# Patient Record
Sex: MALE | Race: WHITE | NOT HISPANIC OR LATINO | Employment: FULL TIME | URBAN - METROPOLITAN AREA
[De-identification: names, ages, dates, MRNs, and addresses within clinical notes are randomized per-mention and may not be internally consistent; named-entity substitution may affect disease eponyms.]

---

## 2018-07-02 ENCOUNTER — OFFICE VISIT (OUTPATIENT)
Dept: FAMILY MEDICINE CLINIC | Facility: CLINIC | Age: 43
End: 2018-07-02

## 2018-07-02 VITALS
HEIGHT: 73 IN | BODY MASS INDEX: 29.31 KG/M2 | OXYGEN SATURATION: 98 % | WEIGHT: 221.2 LBS | RESPIRATION RATE: 18 BRPM | HEART RATE: 82 BPM | SYSTOLIC BLOOD PRESSURE: 118 MMHG | DIASTOLIC BLOOD PRESSURE: 76 MMHG

## 2018-07-02 DIAGNOSIS — G89.4 CHRONIC PAIN SYNDROME: ICD-10-CM

## 2018-07-02 DIAGNOSIS — M1A.0790 CHRONIC IDIOPATHIC GOUT INVOLVING TOE WITHOUT TOPHUS, UNSPECIFIED LATERALITY: ICD-10-CM

## 2018-07-02 DIAGNOSIS — I10 ESSENTIAL HYPERTENSION: ICD-10-CM

## 2018-07-02 DIAGNOSIS — Z00.00 HEALTHCARE MAINTENANCE: Primary | ICD-10-CM

## 2018-07-02 LAB
ALBUMIN SERPL-MCNC: 4.38 G/DL (ref 3.2–4.8)
ALBUMIN/GLOB SERPL: 2 G/DL (ref 1.5–2.5)
ALP SERPL-CCNC: 91 U/L (ref 25–100)
ALT SERPL W P-5'-P-CCNC: 53 U/L (ref 7–40)
ANION GAP SERPL CALCULATED.3IONS-SCNC: 10 MMOL/L (ref 3–11)
ARTICHOKE IGE QN: 118 MG/DL (ref 0–130)
AST SERPL-CCNC: 30 U/L (ref 0–33)
BASOPHILS # BLD AUTO: 0.01 10*3/MM3 (ref 0–0.2)
BASOPHILS NFR BLD AUTO: 0.3 % (ref 0–1)
BILIRUB SERPL-MCNC: 1 MG/DL (ref 0.3–1.2)
BUN BLD-MCNC: 13 MG/DL (ref 9–23)
BUN/CREAT SERPL: 14.3 (ref 7–25)
CALCIUM SPEC-SCNC: 9.3 MG/DL (ref 8.7–10.4)
CHLORIDE SERPL-SCNC: 103 MMOL/L (ref 99–109)
CHOLEST SERPL-MCNC: 177 MG/DL (ref 0–200)
CO2 SERPL-SCNC: 30 MMOL/L (ref 20–31)
CREAT BLD-MCNC: 0.91 MG/DL (ref 0.6–1.3)
CRP SERPL-MCNC: 0.12 MG/DL (ref 0–1)
DEPRECATED RDW RBC AUTO: 45.6 FL (ref 37–54)
EOSINOPHIL # BLD AUTO: 0.06 10*3/MM3 (ref 0–0.3)
EOSINOPHIL NFR BLD AUTO: 2 % (ref 0–3)
ERYTHROCYTE [DISTWIDTH] IN BLOOD BY AUTOMATED COUNT: 13.3 % (ref 11.3–14.5)
GFR SERPL CREATININE-BSD FRML MDRD: 90 ML/MIN/1.73
GLOBULIN UR ELPH-MCNC: 2.2 GM/DL
GLUCOSE BLD-MCNC: 122 MG/DL (ref 70–100)
HBA1C MFR BLD: 4.7 % (ref 4.8–5.6)
HCT VFR BLD AUTO: 43.4 % (ref 38.9–50.9)
HDLC SERPL-MCNC: 43 MG/DL (ref 40–60)
HGB BLD-MCNC: 15.2 G/DL (ref 13.1–17.5)
HIV1+2 AB SER QL: NORMAL
IMM GRANULOCYTES # BLD: 0.01 10*3/MM3 (ref 0–0.03)
IMM GRANULOCYTES NFR BLD: 0.3 % (ref 0–0.6)
LYMPHOCYTES # BLD AUTO: 0.95 10*3/MM3 (ref 0.6–4.8)
LYMPHOCYTES NFR BLD AUTO: 31.3 % (ref 24–44)
MCH RBC QN AUTO: 33.8 PG (ref 27–31)
MCHC RBC AUTO-ENTMCNC: 35 G/DL (ref 32–36)
MCV RBC AUTO: 96.4 FL (ref 80–99)
MONOCYTES # BLD AUTO: 0.28 10*3/MM3 (ref 0–1)
MONOCYTES NFR BLD AUTO: 9.2 % (ref 0–12)
NEUTROPHILS # BLD AUTO: 1.73 10*3/MM3 (ref 1.5–8.3)
NEUTROPHILS NFR BLD AUTO: 56.9 % (ref 41–71)
PLATELET # BLD AUTO: 137 10*3/MM3 (ref 150–450)
PMV BLD AUTO: 10.7 FL (ref 6–12)
POTASSIUM BLD-SCNC: 4.4 MMOL/L (ref 3.5–5.5)
PROT SERPL-MCNC: 6.6 G/DL (ref 5.7–8.2)
RBC # BLD AUTO: 4.5 10*6/MM3 (ref 4.2–5.76)
SODIUM BLD-SCNC: 143 MMOL/L (ref 132–146)
TRIGL SERPL-MCNC: 400 MG/DL (ref 0–150)
TSH SERPL DL<=0.05 MIU/L-ACNC: 2.65 MIU/ML (ref 0.35–5.35)
URATE SERPL-MCNC: 4.4 MG/DL (ref 3.7–9.2)
WBC NRBC COR # BLD: 3.04 10*3/MM3 (ref 3.5–10.8)

## 2018-07-02 PROCEDURE — 99386 PREV VISIT NEW AGE 40-64: CPT | Performed by: FAMILY MEDICINE

## 2018-07-02 PROCEDURE — 80061 LIPID PANEL: CPT | Performed by: FAMILY MEDICINE

## 2018-07-02 PROCEDURE — 83036 HEMOGLOBIN GLYCOSYLATED A1C: CPT | Performed by: FAMILY MEDICINE

## 2018-07-02 PROCEDURE — G0432 EIA HIV-1/HIV-2 SCREEN: HCPCS | Performed by: FAMILY MEDICINE

## 2018-07-02 PROCEDURE — 84550 ASSAY OF BLOOD/URIC ACID: CPT | Performed by: FAMILY MEDICINE

## 2018-07-02 PROCEDURE — 86140 C-REACTIVE PROTEIN: CPT | Performed by: FAMILY MEDICINE

## 2018-07-02 PROCEDURE — 36415 COLL VENOUS BLD VENIPUNCTURE: CPT | Performed by: FAMILY MEDICINE

## 2018-07-02 PROCEDURE — 85025 COMPLETE CBC W/AUTO DIFF WBC: CPT | Performed by: FAMILY MEDICINE

## 2018-07-02 PROCEDURE — 90715 TDAP VACCINE 7 YRS/> IM: CPT | Performed by: FAMILY MEDICINE

## 2018-07-02 PROCEDURE — 90471 IMMUNIZATION ADMIN: CPT | Performed by: FAMILY MEDICINE

## 2018-07-02 PROCEDURE — 84443 ASSAY THYROID STIM HORMONE: CPT | Performed by: FAMILY MEDICINE

## 2018-07-02 PROCEDURE — 80053 COMPREHEN METABOLIC PANEL: CPT | Performed by: FAMILY MEDICINE

## 2018-07-02 PROCEDURE — 93000 ELECTROCARDIOGRAM COMPLETE: CPT | Performed by: FAMILY MEDICINE

## 2018-07-02 RX ORDER — LOSARTAN POTASSIUM 100 MG/1
100 TABLET ORAL DAILY
Qty: 90 TABLET | Refills: 3 | Status: SHIPPED | OUTPATIENT
Start: 2018-07-02 | End: 2019-07-03 | Stop reason: SDUPTHER

## 2018-07-02 RX ORDER — MELATONIN
2000 DAILY
COMMUNITY
End: 2019-01-08

## 2018-07-02 RX ORDER — LOSARTAN POTASSIUM 100 MG/1
100 TABLET ORAL DAILY
COMMUNITY
Start: 2018-06-27 | End: 2018-07-02 | Stop reason: SDUPTHER

## 2018-07-02 RX ORDER — ALLOPURINOL 300 MG/1
300 TABLET ORAL DAILY
COMMUNITY
Start: 2018-06-27 | End: 2018-07-02 | Stop reason: SDUPTHER

## 2018-07-02 RX ORDER — COLCHICINE 0.6 MG/1
TABLET ORAL
Qty: 30 TABLET | Refills: 1 | Status: SHIPPED | OUTPATIENT
Start: 2018-07-02 | End: 2018-09-18 | Stop reason: SDUPTHER

## 2018-07-02 RX ORDER — HYDROCODONE BITARTRATE AND ACETAMINOPHEN 7.5; 325 MG/1; MG/1
1 TABLET ORAL AS NEEDED
COMMUNITY
End: 2019-01-08

## 2018-07-02 RX ORDER — ALLOPURINOL 300 MG/1
300 TABLET ORAL DAILY
Qty: 90 TABLET | Refills: 3 | Status: SHIPPED | OUTPATIENT
Start: 2018-07-02 | End: 2019-07-03 | Stop reason: SDUPTHER

## 2018-07-02 NOTE — PROGRESS NOTES
Subjective   Deepak Albarran is a 43 y.o. male.     History of Present Illness   New patient to the office.  He describes previous care with Dr. MOSHE Waldron.  He is here for his annual wellness evaluation.  He is not fasting.  He is also needing a refill of his blood pressure and gout medications.  He is tolerating them well with no adverse events.  He describes good blood pressure control and infrequent gout attacks.  He is also requesting a refill of his narcotic prescription that he takes intermittently for pain.    Review of Systems   Constitutional: Negative.    HENT: Negative.    Eyes: Negative.    Respiratory: Negative.    Cardiovascular: Negative.    Gastrointestinal: Negative.    Endocrine: Negative.    Genitourinary: Negative.    Musculoskeletal:        Arthritic pain   Skin: Negative.    Allergic/Immunologic: Negative.    Neurological: Negative.    Hematological: Negative.    Psychiatric/Behavioral: Negative.        Objective   Physical Exam   Constitutional: He is oriented to person, place, and time. He appears well-developed and well-nourished. He is cooperative.   HENT:   Head: Normocephalic and atraumatic.   Right Ear: Hearing and external ear normal.   Left Ear: Hearing and external ear normal.   Nose: Nose normal.   Mouth/Throat: Uvula is midline, oropharynx is clear and moist and mucous membranes are normal.   Eyes: Conjunctivae and EOM are normal. Pupils are equal, round, and reactive to light. No scleral icterus.   Neck: Trachea normal and normal range of motion. Neck supple. No JVD present. Carotid bruit is not present. No thyromegaly present.   Cardiovascular: Normal rate, regular rhythm, normal heart sounds and intact distal pulses.    Pulmonary/Chest: Effort normal and breath sounds normal.   Abdominal: Soft. Bowel sounds are normal. There is no hepatosplenomegaly. There is no tenderness.   Musculoskeletal: Normal range of motion.   Lymphadenopathy:     He has no cervical adenopathy.    Neurological: He is alert and oriented to person, place, and time. He has normal strength and normal reflexes. No sensory deficit. Gait normal.   Skin: Skin is warm and dry.   Psychiatric: He has a normal mood and affect. His speech is normal and behavior is normal. Judgment and thought content normal. Cognition and memory are normal.   Nursing note and vitals reviewed.      ECG 12 Lead  Date/Time: 7/2/2018 1:44 PM  Performed by: ISSAC LOCKHART  Authorized by: ISSAC LOCKHART   Comparison: not compared with previous ECG   Previous ECG: no previous ECG available  Rhythm: sinus rhythm  Rate: normal  BPM: 71  Conduction: conduction normal  ST Segments: ST segments normal  T Waves: T waves normal  QRS axis: normal  Clinical impression: normal ECG and low voltage            Assessment/Plan   Diagnoses and all orders for this visit:    Healthcare maintenance  -     POC Urinalysis Dipstick, Automated  -     Comprehensive Metabolic Panel  -     CBC & Differential  -     Lipid Panel  -     TSH  -     Uric Acid  -     C-reactive Protein  -     HIV-1 / O / 2 Ag / Antibody 4th Generation  -     Hemoglobin A1c  -     Tdap Vaccine IM  - Vaccine counseling and current VIS is provided for immunizations administered today.    Essential hypertension  -     ECG 12 Lead  -     losartan (COZAAR) 100 MG tablet; Take 1 tablet by mouth Daily # 90 with #RF  -     POC Urinalysis Dipstick, Automated  -     Comprehensive Metabolic Panel  -     Lipid Panel  -     C-reactive Protein  - Healthy heart diet  - Weight loss  - Daily aerobic exercise  - Routine blood pressure monitoring  - [place] Heart Disease and Stroke Prevention Task Force pamphlet reviewed and administered.    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  -     colchicine 0.6 MG tablet; Take 1-2 tabs po once daily prn gout attack  -     Uric Acid  - Low purine diet  - Daily  hydration  - Avoid excessive alcohol intake    Chronic pain syndrome  -     Ambulatory Referral to Pain Management    The patient is here for a health maintenance visit.  Currently, the patient consumes a calorie enriched diet and has an adequate exercise regimen. Screening lab work is ordered.  Immunizations are updated today.  Advice and education is given regarding nutrition, aerobic exercise, routine dental evaluations, routine eye exams, reproductive health, cardiovascular risk reduction, sunscreen use, self skin examination (annual dermatology evaluations) and seat belt use (general overall safety).  Further recommendations after lab evaluation.  Annual wellness evaluations recommended.

## 2018-09-18 DIAGNOSIS — M1A.0790 CHRONIC IDIOPATHIC GOUT INVOLVING TOE WITHOUT TOPHUS, UNSPECIFIED LATERALITY: ICD-10-CM

## 2018-09-18 RX ORDER — COLCHICINE 0.6 MG/1
TABLET ORAL
Qty: 30 TABLET | Refills: 0 | Status: SHIPPED | OUTPATIENT
Start: 2018-09-18 | End: 2018-09-24 | Stop reason: ALTCHOICE

## 2018-09-24 ENCOUNTER — PRIOR AUTHORIZATION (OUTPATIENT)
Dept: FAMILY MEDICINE CLINIC | Facility: CLINIC | Age: 43
End: 2018-09-24

## 2018-09-24 DIAGNOSIS — M1A.0790 IDIOPATHIC CHRONIC GOUT OF FOOT WITHOUT TOPHUS, UNSPECIFIED LATERALITY: Primary | ICD-10-CM

## 2018-09-24 RX ORDER — COLCHICINE 0.6 MG/1
0.6 CAPSULE ORAL 2 TIMES DAILY PRN
Qty: 30 CAPSULE | Refills: 1 | Status: SHIPPED | OUTPATIENT
Start: 2018-09-24 | End: 2018-09-27

## 2018-09-24 NOTE — TELEPHONE ENCOUNTER
PA submitted to insurance via [email or web address]    BIN: [id number]  PCN: [id number]  Group: [facility]  ID: [id number]  DX: [id number]  9-19-18 9:58am

## 2019-01-08 ENCOUNTER — OFFICE VISIT (OUTPATIENT)
Dept: FAMILY MEDICINE CLINIC | Facility: CLINIC | Age: 44
End: 2019-01-08

## 2019-01-08 VITALS
BODY MASS INDEX: 28.63 KG/M2 | HEART RATE: 84 BPM | TEMPERATURE: 98.1 F | WEIGHT: 216 LBS | DIASTOLIC BLOOD PRESSURE: 80 MMHG | RESPIRATION RATE: 18 BRPM | OXYGEN SATURATION: 98 % | SYSTOLIC BLOOD PRESSURE: 132 MMHG | HEIGHT: 73 IN

## 2019-01-08 DIAGNOSIS — J40 BRONCHITIS: Primary | ICD-10-CM

## 2019-01-08 DIAGNOSIS — R10.32 LEFT LOWER QUADRANT PAIN: ICD-10-CM

## 2019-01-08 PROCEDURE — 99214 OFFICE O/P EST MOD 30 MIN: CPT | Performed by: NURSE PRACTITIONER

## 2019-01-08 RX ORDER — DEXTROMETHORPHAN HYDROBROMIDE AND PROMETHAZINE HYDROCHLORIDE 15; 6.25 MG/5ML; MG/5ML
5 SYRUP ORAL 4 TIMES DAILY PRN
Qty: 240 ML | Refills: 0 | Status: SHIPPED | OUTPATIENT
Start: 2019-01-08 | End: 2019-01-18

## 2019-01-08 RX ORDER — AZITHROMYCIN 250 MG/1
TABLET, FILM COATED ORAL
Qty: 6 TABLET | Refills: 0 | Status: SHIPPED | OUTPATIENT
Start: 2019-01-08 | End: 2019-01-18

## 2019-01-08 RX ORDER — METHYLPREDNISOLONE 4 MG/1
TABLET ORAL
Qty: 21 EACH | Refills: 0 | Status: SHIPPED | OUTPATIENT
Start: 2019-01-08 | End: 2019-01-18

## 2019-01-08 RX ORDER — ALBUTEROL SULFATE 90 UG/1
2 AEROSOL, METERED RESPIRATORY (INHALATION) EVERY 4 HOURS PRN
Qty: 1 INHALER | Refills: 11 | Status: SHIPPED | OUTPATIENT
Start: 2019-01-08 | End: 2019-07-03

## 2019-01-08 NOTE — PROGRESS NOTES
Subjective   Deepak Albarran is a 43 y.o. male.     History of Present Illness Patient with 2 complaints today.  1. Complains of 5 weeks of persistent cough. It is mostly dry but will have occasional white sputum. No fever, or sob. Has had some wheezing and chest wall pain from severe cough. No known sick contacts. No night sweats or chills. Treated with many otc meds without relief.  2. Also complains of fullness of LLQ and occasional diarrhea over last few months. Not really related to foods. No melena or hematochezia. No unexplained weight loss. No FH of early colon cancer. No nausea. No treatment.    The following portions of the patient's history were reviewed and updated as appropriate: allergies, current medications, past family history, past medical history, past social history, past surgical history and problem list.    Review of Systems   Constitutional: Negative for appetite change, fever and unexpected weight loss.   HENT: Negative for nosebleeds, sore throat and trouble swallowing.    Eyes: Negative for visual disturbance.   Respiratory: Positive for cough and chest tightness. Negative for shortness of breath and wheezing.    Cardiovascular: Negative for chest pain, palpitations and leg swelling.   Gastrointestinal: Positive for abdominal pain and diarrhea. Negative for blood in stool, constipation, nausea and vomiting.   Endocrine: Negative for polydipsia, polyphagia and polyuria.   Genitourinary: Negative for urinary incontinence, dysuria, frequency and hematuria.   Musculoskeletal: Negative for arthralgias, gait problem, joint swelling and myalgias.   Skin: Negative for rash.   Neurological: Negative for dizziness, seizures, syncope and numbness.   Hematological: Negative for adenopathy. Does not bruise/bleed easily.   Psychiatric/Behavioral: Negative for sleep disturbance and depressed mood. The patient is not nervous/anxious.        Objective   Physical Exam   Constitutional: He is oriented to  person, place, and time. He appears well-developed and well-nourished. No distress.   HENT:   Head: Normocephalic and atraumatic.   Right Ear: Tympanic membrane and external ear normal.   Left Ear: Tympanic membrane and external ear normal.   Nose: Nose normal.   Mouth/Throat: Oropharynx is clear and moist. No oropharyngeal exudate.   Eyes: Conjunctivae are normal. Pupils are equal, round, and reactive to light. Right eye exhibits no discharge. Left eye exhibits no discharge. No scleral icterus.   Neck: Neck supple. No tracheal deviation present. No thyromegaly present.   Cardiovascular: Normal rate, regular rhythm and normal heart sounds. Exam reveals no gallop and no friction rub.   No murmur heard.  Pulmonary/Chest: Effort normal and breath sounds normal. No respiratory distress. He has no wheezes.   Persistent dry cough that is severe and harsh   Abdominal: Soft. Bowel sounds are normal. He exhibits no distension and no mass. There is no tenderness.   No guarding or mass. Slight tenderness on LLQ   Musculoskeletal: He exhibits no edema or deformity.   Lymphadenopathy:     He has no cervical adenopathy.   Neurological: He is alert and oriented to person, place, and time. Coordination normal.   Skin: Skin is warm and dry. Capillary refill takes less than 2 seconds. No rash noted. No erythema.   Psychiatric: He has a normal mood and affect. His speech is normal and behavior is normal. Judgment and thought content normal.   Nursing note and vitals reviewed.        Assessment/Plan   Deepak was seen today for uri.    Diagnoses and all orders for this visit:    Bronchitis  -     azithromycin (ZITHROMAX) 250 MG tablet; Take 2 tablets the first day, then 1 tablet daily for 4 days.  -     promethazine-dextromethorphan (PROMETHAZINE-DM) 6.25-15 MG/5ML syrup; Take 5 mL by mouth 4 (Four) Times a Day As Needed for Cough.  -     albuterol sulfate  (90 Base) MCG/ACT inhaler; Inhale 2 puffs Every 4 (Four) Hours As Needed  for Wheezing.  -     MethylPREDNISolone (MEDROL, MONSE,) 4 MG tablet; Take as directed on package instructions.    Left lower quadrant pain  -     US Abdomen Complete; Future    Increase fluids. Use inhaler 2 puffs qid. Instructions on proper use of inhaler provided. Follow up symptoms persist or worsen. He may need CXR.  Discussed the nature of the disease including, risks, complications, implications, management, safe and proper use of medications. Encouraged therapeutic lifestyle changes including low calorie diet with plenty of fruits and vegetables, daily exercise, medication compliance, and keeping scheduled follow up appointments with me and any other providers. Encouraged patient to have appointment for complete physical, fasting labs, appropriate screenings, and immunizations on an annual basis.  US of abd. Follow up after US. He may need CT and colonoscopy.  Patient verbalized understanding.

## 2019-01-14 DIAGNOSIS — R10.32 ABDOMINAL DISCOMFORT IN LEFT LOWER QUADRANT: Primary | ICD-10-CM

## 2019-01-15 ENCOUNTER — HOSPITAL ENCOUNTER (OUTPATIENT)
Dept: ULTRASOUND IMAGING | Facility: HOSPITAL | Age: 44
Discharge: HOME | End: 2019-01-15
Admitting: NURSE PRACTITIONER

## 2019-01-15 DIAGNOSIS — R10.32 LEFT LOWER QUADRANT PAIN: ICD-10-CM

## 2019-01-15 PROCEDURE — 76857 US EXAM PELVIC LIMITED: CPT

## 2019-01-18 ENCOUNTER — HOSPITAL ENCOUNTER (OUTPATIENT)
Dept: GENERAL RADIOLOGY | Facility: HOSPITAL | Age: 44
Discharge: HOME | End: 2019-01-18
Admitting: NURSE PRACTITIONER

## 2019-01-18 ENCOUNTER — OFFICE VISIT (OUTPATIENT)
Dept: FAMILY MEDICINE CLINIC | Facility: CLINIC | Age: 44
End: 2019-01-18

## 2019-01-18 VITALS
BODY MASS INDEX: 28.1 KG/M2 | DIASTOLIC BLOOD PRESSURE: 70 MMHG | HEART RATE: 110 BPM | HEIGHT: 73 IN | OXYGEN SATURATION: 97 % | RESPIRATION RATE: 18 BRPM | WEIGHT: 212 LBS | SYSTOLIC BLOOD PRESSURE: 132 MMHG | TEMPERATURE: 98 F

## 2019-01-18 DIAGNOSIS — R05.3 PERSISTENT COUGH: Primary | ICD-10-CM

## 2019-01-18 DIAGNOSIS — R05.3 PERSISTENT COUGH: ICD-10-CM

## 2019-01-18 PROCEDURE — 71046 X-RAY EXAM CHEST 2 VIEWS: CPT

## 2019-01-18 PROCEDURE — 96372 THER/PROPH/DIAG INJ SC/IM: CPT | Performed by: NURSE PRACTITIONER

## 2019-01-18 PROCEDURE — 99213 OFFICE O/P EST LOW 20 MIN: CPT | Performed by: NURSE PRACTITIONER

## 2019-01-18 RX ORDER — METHYLPREDNISOLONE ACETATE 40 MG/ML
40 INJECTION, SUSPENSION INTRA-ARTICULAR; INTRALESIONAL; INTRAMUSCULAR; SOFT TISSUE ONCE
Status: COMPLETED | OUTPATIENT
Start: 2019-01-18 | End: 2019-01-18

## 2019-01-18 RX ORDER — CEFDINIR 300 MG/1
300 CAPSULE ORAL 2 TIMES DAILY
Qty: 20 CAPSULE | Refills: 0 | Status: SHIPPED | OUTPATIENT
Start: 2019-01-18 | End: 2019-07-03

## 2019-01-18 RX ADMIN — METHYLPREDNISOLONE ACETATE 40 MG: 40 INJECTION, SUSPENSION INTRA-ARTICULAR; INTRALESIONAL; INTRAMUSCULAR; SOFT TISSUE at 15:15

## 2019-01-18 NOTE — PROGRESS NOTES
Subjective   Deepak Albarran is a 43 y.o. male.     History of Present Illness Patient is here to follow up on cough. Present now for 8 weeks. Still wheezing. Talking makes him cough. No fever. Took steroids and z-pack. Using albuterol inhaler.     The following portions of the patient's history were reviewed and updated as appropriate: allergies, current medications, past family history, past medical history, past social history, past surgical history and problem list.    Review of Systems   Constitutional: Negative for appetite change, fever and unexpected weight loss.   HENT: Negative for nosebleeds, sore throat and trouble swallowing.    Eyes: Negative for visual disturbance.   Respiratory: Positive for cough, shortness of breath and wheezing.    Cardiovascular: Negative for chest pain, palpitations and leg swelling.   Gastrointestinal: Negative for abdominal pain, blood in stool, constipation, diarrhea, nausea and vomiting.   Endocrine: Negative for polydipsia, polyphagia and polyuria.   Genitourinary: Negative for urinary incontinence, dysuria, frequency and hematuria.   Musculoskeletal: Negative for arthralgias, gait problem, joint swelling and myalgias.   Skin: Negative for rash.   Neurological: Negative for dizziness, seizures, syncope and numbness.   Hematological: Negative for adenopathy. Does not bruise/bleed easily.   Psychiatric/Behavioral: Negative for sleep disturbance and depressed mood. The patient is not nervous/anxious.        Objective   Physical Exam   Constitutional: He is oriented to person, place, and time. He appears well-developed and well-nourished. No distress.   HENT:   Head: Normocephalic and atraumatic.   Right Ear: Tympanic membrane and external ear normal.   Left Ear: Tympanic membrane and external ear normal.   Nose: Nose normal.   Mouth/Throat: Oropharynx is clear and moist. No oropharyngeal exudate.   Eyes: Conjunctivae are normal. Pupils are equal, round, and reactive to light.  Right eye exhibits no discharge. Left eye exhibits no discharge. No scleral icterus.   Neck: Neck supple. No tracheal deviation present. No thyromegaly present.   Cardiovascular: Normal rate, regular rhythm and normal heart sounds. Exam reveals no gallop and no friction rub.   No murmur heard.  Pulmonary/Chest: Effort normal and breath sounds normal. No respiratory distress. He has no wheezes.   Abdominal: Soft. Bowel sounds are normal. He exhibits no distension and no mass. There is no tenderness.   Musculoskeletal: He exhibits no edema or deformity.   Lymphadenopathy:     He has no cervical adenopathy.   Neurological: He is alert and oriented to person, place, and time. Coordination normal.   Skin: Skin is warm and dry. Capillary refill takes less than 2 seconds. No rash noted. No erythema.   Psychiatric: He has a normal mood and affect. His speech is normal and behavior is normal. Judgment and thought content normal.   Nursing note and vitals reviewed.        Assessment/Plan   Deepak was seen today for cough.    Diagnoses and all orders for this visit:    Persistent cough  -     XR Chest PA & Lateral; Future  -     fluticasone-salmeterol (ADVAIR DISKUS) 500-50 MCG/DOSE DISKUS; Inhale 1 puff 2 (Two) Times a Day.  -     methylPREDNISolone acetate (DEPO-medrol) injection 40 mg; Inject 1 mL into the appropriate muscle as directed by prescriber 1 (One) Time.  -     cefdinir (OMNICEF) 300 MG capsule; Take 1 capsule by mouth 2 (Two) Times a Day.      He has been coughing for 8 weeks. If he continues to cough after treatment and CXR is normal will refer to Pulm.  Rinse mouth out after Advair.  Continue Albuterol.

## 2019-01-20 ENCOUNTER — TELEPHONE (OUTPATIENT)
Dept: FAMILY MEDICINE CLINIC | Facility: CLINIC | Age: 44
End: 2019-01-20

## 2019-07-03 ENCOUNTER — OFFICE VISIT (OUTPATIENT)
Dept: FAMILY MEDICINE CLINIC | Facility: CLINIC | Age: 44
End: 2019-07-03

## 2019-07-03 VITALS
HEIGHT: 73 IN | DIASTOLIC BLOOD PRESSURE: 80 MMHG | TEMPERATURE: 97.1 F | OXYGEN SATURATION: 98 % | HEART RATE: 91 BPM | RESPIRATION RATE: 16 BRPM | WEIGHT: 213.2 LBS | BODY MASS INDEX: 28.26 KG/M2 | SYSTOLIC BLOOD PRESSURE: 112 MMHG

## 2019-07-03 DIAGNOSIS — Z00.00 HEALTHCARE MAINTENANCE: ICD-10-CM

## 2019-07-03 DIAGNOSIS — I10 ESSENTIAL HYPERTENSION: ICD-10-CM

## 2019-07-03 DIAGNOSIS — M1A.0790 CHRONIC IDIOPATHIC GOUT INVOLVING TOE WITHOUT TOPHUS, UNSPECIFIED LATERALITY: ICD-10-CM

## 2019-07-03 LAB
ALBUMIN SERPL-MCNC: 4.8 G/DL (ref 3.5–5.2)
ALBUMIN/GLOB SERPL: 1.8 G/DL
ALP SERPL-CCNC: 92 U/L (ref 39–117)
ALT SERPL W P-5'-P-CCNC: 46 U/L (ref 1–41)
ANION GAP SERPL CALCULATED.3IONS-SCNC: 12 MMOL/L (ref 5–15)
AST SERPL-CCNC: 31 U/L (ref 1–40)
BASOPHILS # BLD AUTO: 0.03 10*3/MM3 (ref 0–0.2)
BASOPHILS NFR BLD AUTO: 0.7 % (ref 0–1.5)
BILIRUB BLD-MCNC: NEGATIVE MG/DL
BILIRUB SERPL-MCNC: 1.1 MG/DL (ref 0.2–1.2)
BUN BLD-MCNC: 13 MG/DL (ref 6–20)
BUN/CREAT SERPL: 12.9 (ref 7–25)
CALCIUM SPEC-SCNC: 9.9 MG/DL (ref 8.6–10.5)
CHLORIDE SERPL-SCNC: 100 MMOL/L (ref 98–107)
CHOLEST SERPL-MCNC: 181 MG/DL (ref 0–200)
CLARITY, POC: CLEAR
CO2 SERPL-SCNC: 26 MMOL/L (ref 22–29)
COLOR UR: YELLOW
CREAT BLD-MCNC: 1.01 MG/DL (ref 0.76–1.27)
CRP SERPL-MCNC: 0.19 MG/DL (ref 0–0.5)
DEPRECATED RDW RBC AUTO: 47.2 FL (ref 37–54)
EOSINOPHIL # BLD AUTO: 0.09 10*3/MM3 (ref 0–0.4)
EOSINOPHIL NFR BLD AUTO: 2.2 % (ref 0.3–6.2)
ERYTHROCYTE [DISTWIDTH] IN BLOOD BY AUTOMATED COUNT: 13.6 % (ref 12.3–15.4)
GFR SERPL CREATININE-BSD FRML MDRD: 80 ML/MIN/1.73
GLOBULIN UR ELPH-MCNC: 2.7 GM/DL
GLUCOSE BLD-MCNC: 92 MG/DL (ref 65–99)
GLUCOSE UR STRIP-MCNC: NEGATIVE MG/DL
HBA1C MFR BLD: 4.8 % (ref 4.8–5.6)
HCT VFR BLD AUTO: 51.3 % (ref 37.5–51)
HDLC SERPL-MCNC: 41 MG/DL (ref 40–60)
HGB BLD-MCNC: 17.9 G/DL (ref 13–17.7)
HIV1+2 AB SER QL: NORMAL
IMM GRANULOCYTES # BLD AUTO: 0.01 10*3/MM3 (ref 0–0.05)
IMM GRANULOCYTES NFR BLD AUTO: 0.2 % (ref 0–0.5)
KETONES UR QL: NEGATIVE
LDLC SERPL CALC-MCNC: 80 MG/DL (ref 0–100)
LDLC/HDLC SERPL: 1.96 {RATIO}
LEUKOCYTE EST, POC: ABNORMAL
LYMPHOCYTES # BLD AUTO: 1.44 10*3/MM3 (ref 0.7–3.1)
LYMPHOCYTES NFR BLD AUTO: 35.6 % (ref 19.6–45.3)
MCH RBC QN AUTO: 33.5 PG (ref 26.6–33)
MCHC RBC AUTO-ENTMCNC: 34.9 G/DL (ref 31.5–35.7)
MCV RBC AUTO: 95.9 FL (ref 79–97)
MONOCYTES # BLD AUTO: 0.32 10*3/MM3 (ref 0.1–0.9)
MONOCYTES NFR BLD AUTO: 7.9 % (ref 5–12)
NEUTROPHILS # BLD AUTO: 2.16 10*3/MM3 (ref 1.7–7)
NEUTROPHILS NFR BLD AUTO: 53.4 % (ref 42.7–76)
NITRITE UR-MCNC: NEGATIVE MG/ML
NRBC BLD AUTO-RTO: 0 /100 WBC (ref 0–0.2)
PH UR: 6 [PH] (ref 5–8)
PLATELET # BLD AUTO: 155 10*3/MM3 (ref 140–450)
PMV BLD AUTO: 10.1 FL (ref 6–12)
POTASSIUM BLD-SCNC: 4.9 MMOL/L (ref 3.5–5.2)
PROT SERPL-MCNC: 7.5 G/DL (ref 6–8.5)
PROT UR STRIP-MCNC: NEGATIVE MG/DL
RBC # BLD AUTO: 5.35 10*6/MM3 (ref 4.14–5.8)
RBC # UR STRIP: NEGATIVE /UL
SODIUM BLD-SCNC: 138 MMOL/L (ref 136–145)
SP GR UR: 1.02 (ref 1–1.03)
TRIGL SERPL-MCNC: 298 MG/DL (ref 0–150)
TSH SERPL DL<=0.05 MIU/L-ACNC: 2.69 MIU/ML (ref 0.27–4.2)
URATE SERPL-MCNC: 4.8 MG/DL (ref 3.4–7)
UROBILINOGEN UR QL: NORMAL
VLDLC SERPL-MCNC: 59.6 MG/DL (ref 5–40)
WBC NRBC COR # BLD: 4.05 10*3/MM3 (ref 3.4–10.8)

## 2019-07-03 PROCEDURE — 99396 PREV VISIT EST AGE 40-64: CPT | Performed by: FAMILY MEDICINE

## 2019-07-03 PROCEDURE — 85025 COMPLETE CBC W/AUTO DIFF WBC: CPT | Performed by: FAMILY MEDICINE

## 2019-07-03 PROCEDURE — 81003 URINALYSIS AUTO W/O SCOPE: CPT | Performed by: FAMILY MEDICINE

## 2019-07-03 PROCEDURE — 83036 HEMOGLOBIN GLYCOSYLATED A1C: CPT | Performed by: FAMILY MEDICINE

## 2019-07-03 PROCEDURE — 80053 COMPREHEN METABOLIC PANEL: CPT | Performed by: FAMILY MEDICINE

## 2019-07-03 PROCEDURE — G0432 EIA HIV-1/HIV-2 SCREEN: HCPCS | Performed by: FAMILY MEDICINE

## 2019-07-03 PROCEDURE — 84550 ASSAY OF BLOOD/URIC ACID: CPT | Performed by: FAMILY MEDICINE

## 2019-07-03 PROCEDURE — 80061 LIPID PANEL: CPT | Performed by: FAMILY MEDICINE

## 2019-07-03 PROCEDURE — 36415 COLL VENOUS BLD VENIPUNCTURE: CPT | Performed by: FAMILY MEDICINE

## 2019-07-03 PROCEDURE — 86140 C-REACTIVE PROTEIN: CPT | Performed by: FAMILY MEDICINE

## 2019-07-03 PROCEDURE — 84443 ASSAY THYROID STIM HORMONE: CPT | Performed by: FAMILY MEDICINE

## 2019-07-03 RX ORDER — LOSARTAN POTASSIUM 100 MG/1
100 TABLET ORAL DAILY
Qty: 90 TABLET | Refills: 3 | Status: SHIPPED | OUTPATIENT
Start: 2019-07-03 | End: 2020-07-06 | Stop reason: SDUPTHER

## 2019-07-03 RX ORDER — ALLOPURINOL 300 MG/1
300 TABLET ORAL DAILY
Qty: 90 TABLET | Refills: 3 | Status: SHIPPED | OUTPATIENT
Start: 2019-07-03 | End: 2020-07-06 | Stop reason: SDUPTHER

## 2019-07-03 NOTE — PROGRESS NOTES
"Subjective   Deepak Albarran is a 44 y.o. male.     History of Present Illness   He is here for his annual fasting wellness evaluation.  He is current on his immunizations.  He voices no acute symptoms.  He is needing a refill of his medications.  He is tolerating them well with no adverse events.  He describes good blood pressure control with no recent gout attacks.    Medical History  I have reviewed and updated the following portions of the patient's history: allergies, current medications, past medical history, past surgical history, past family history, and past social history.    Review of Systems   Constitutional: Negative.    HENT: Negative.    Eyes: Negative.    Respiratory: Negative.    Cardiovascular: Negative.    Gastrointestinal: Negative.    Endocrine: Negative.    Genitourinary: Negative.    Musculoskeletal: Negative.    Skin: Negative.    Allergic/Immunologic: Negative.    Neurological: Negative.    Hematological: Negative.    Psychiatric/Behavioral: Negative.    All other systems reviewed and are negative.      Objective   /80   Pulse 91   Temp 97.1 °F (36.2 °C) (Temporal)   Resp 16   Ht 185.4 cm (72.99\")   Wt 96.7 kg (213 lb 3.2 oz)   SpO2 98%   BMI 28.13 kg/m²     Physical Exam   Constitutional: He is oriented to person, place, and time. He appears well-developed and well-nourished. He is cooperative.   HENT:   Head: Normocephalic.   Right Ear: Hearing and external ear normal.   Left Ear: Hearing and external ear normal.   Nose: Nose normal.   Mouth/Throat: Uvula is midline, oropharynx is clear and moist and mucous membranes are normal.   Eyes: Conjunctivae and EOM are normal. Pupils are equal, round, and reactive to light. No scleral icterus.   Neck: Trachea normal and normal range of motion. Neck supple. No JVD present. Carotid bruit is not present. No thyromegaly present.   Cardiovascular: Normal rate, regular rhythm and normal heart sounds.   Pulmonary/Chest: Effort normal and " breath sounds normal.   Abdominal: Soft. Bowel sounds are normal. There is no tenderness.   Lymphadenopathy:     He has no cervical adenopathy.   Neurological: He is alert and oriented to person, place, and time. He has normal strength. No cranial nerve deficit or sensory deficit. Coordination and gait normal.   Skin: Skin is warm and dry. No rash noted.   Psychiatric: He has a normal mood and affect. His speech is normal and behavior is normal. Judgment and thought content normal. Cognition and memory are normal.   Nursing note and vitals reviewed.      Assessment/Plan   Diagnoses and all orders for this visit:    Healthcare maintenance  -     POC Urinalysis Dipstick, Automated  -     Comprehensive Metabolic Panel  -     CBC & Differential  -     Lipid Panel  -     TSH  -     Uric Acid  -     C-reactive Protein  -     HIV-1 / O / 2 Ag / Antibody 4th Generation  -     Hemoglobin A1c    Essential hypertension  -     losartan (COZAAR) 100 MG tablet; Take 1 tablet by mouth Daily. #90 c 3 RF  -     POC Urinalysis Dipstick, Automated  -     Comprehensive Metabolic Panel  - Healthy heart diet  - Low dose aspirin once daily  - Daily aerobic exercise  - Routine blood pressure monitoring  - [place] Heart Disease and Stroke Prevention Task Force pamphlet reviewed and administered.    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  -     Uric Acid  - Low purine diet    The patient is here for a health maintenance visit.  Currently, the patient consumes a calorie enriched diet and has an adequate exercise regimen. Screening lab work is ordered.  Immunizations are reviewed today.  Advice and education is given regarding nutrition, aerobic exercise, routine dental evaluations, routine eye exams, reproductive health, cardiovascular risk reduction, sunscreen use, self skin examination (annual dermatology evaluations) and seat  belt use (general overall safety).  Further recommendations after lab evaluation.  Annual wellness evaluations recommended.

## 2019-07-16 ENCOUNTER — TELEPHONE (OUTPATIENT)
Dept: FAMILY MEDICINE CLINIC | Facility: CLINIC | Age: 44
End: 2019-07-16

## 2019-07-16 NOTE — TELEPHONE ENCOUNTER
SPKE TO PT, PER DR KULKARNI LETTER, ALL LABS WERE SATISFACTORY.   PT VERBALIZED UNDERSTANDING.     ----- Message from Roma Monteiro sent at 7/15/2019  3:23 PM EDT -----  Contact: [phone]  Pt would like a call. He received the letter about his lab results and does not understand.

## 2019-10-03 ENCOUNTER — OFFICE VISIT (OUTPATIENT)
Dept: FAMILY MEDICINE CLINIC | Facility: CLINIC | Age: 44
End: 2019-10-03

## 2019-10-03 VITALS
OXYGEN SATURATION: 99 % | TEMPERATURE: 98.3 F | DIASTOLIC BLOOD PRESSURE: 88 MMHG | HEART RATE: 92 BPM | HEIGHT: 73 IN | SYSTOLIC BLOOD PRESSURE: 134 MMHG | WEIGHT: 211 LBS | BODY MASS INDEX: 27.96 KG/M2

## 2019-10-03 DIAGNOSIS — J01.00 ACUTE MAXILLARY SINUSITIS, RECURRENCE NOT SPECIFIED: ICD-10-CM

## 2019-10-03 DIAGNOSIS — J40 BRONCHITIS: Primary | ICD-10-CM

## 2019-10-03 PROCEDURE — 99213 OFFICE O/P EST LOW 20 MIN: CPT | Performed by: NURSE PRACTITIONER

## 2019-10-03 RX ORDER — CEPHALEXIN 500 MG/1
500 CAPSULE ORAL 2 TIMES DAILY
Qty: 20 CAPSULE | Refills: 0 | Status: SHIPPED | OUTPATIENT
Start: 2019-10-03 | End: 2019-10-13

## 2019-10-03 RX ORDER — DEXTROMETHORPHAN HYDROBROMIDE AND PROMETHAZINE HYDROCHLORIDE 15; 6.25 MG/5ML; MG/5ML
5 SYRUP ORAL 4 TIMES DAILY PRN
Qty: 240 ML | Refills: 0 | OUTPATIENT
Start: 2019-10-03 | End: 2020-06-26

## 2019-10-03 RX ORDER — IPRATROPIUM BROMIDE AND ALBUTEROL SULFATE 2.5; .5 MG/3ML; MG/3ML
3 SOLUTION RESPIRATORY (INHALATION) ONCE
Status: COMPLETED | OUTPATIENT
Start: 2019-10-03 | End: 2019-10-03

## 2019-10-03 RX ORDER — ALBUTEROL SULFATE 90 UG/1
2 AEROSOL, METERED RESPIRATORY (INHALATION) EVERY 4 HOURS PRN
Qty: 1 INHALER | Refills: 1 | Status: SHIPPED | OUTPATIENT
Start: 2019-10-03 | End: 2019-11-02

## 2019-10-03 RX ORDER — PREDNISONE 10 MG/1
TABLET ORAL
Qty: 21 TABLET | Refills: 0 | OUTPATIENT
Start: 2019-10-03 | End: 2020-06-26

## 2019-10-03 RX ADMIN — IPRATROPIUM BROMIDE AND ALBUTEROL SULFATE 3 ML: 2.5; .5 SOLUTION RESPIRATORY (INHALATION) at 09:11

## 2019-10-03 NOTE — PATIENT INSTRUCTIONS
Sinusitis, Adult  Sinusitis is soreness and swelling (inflammation) of your sinuses. Sinuses are hollow spaces in the bones around your face. They are located:  · Around your eyes.  · In the middle of your forehead.  · Behind your nose.  · In your cheekbones.  Your sinuses and nasal passages are lined with a stringy fluid (mucus). Mucus normally drains out of your sinuses. Swelling can trap mucus in your sinuses. This lets germs like bacteria or viruses grow, and that leads to infection. Most of the time, sinusitis is caused by a virus.  If bacteria is causing your infection, your doctor may have you wait and see if you get better without antibiotic medicine. You may be prescribed antibiotics if you have:  · A very bad infection.  · A weak disease-fighting (immune) system.  Follow these instructions at home:  Medicines  · Take, use, or apply over-the-counter and prescription medicines only as told by your doctor. These may include nasal sprays.  · If you were prescribed an antibiotic, take it as told by your doctor. Do not stop taking the antibiotic even if you start to feel better.  Hydrate and Humidify  · Drink enough water to keep your pee (urine) pale yellow.  · Use a cool mist humidifier to keep the humidity level in your home above 50%.  · Breathe in steam for 10-15 minutes, 3-4 times a day or as told by your doctor. You can do this in the bathroom while a hot shower is running.  · Try not to spend time in cool or dry air.  Rest  · Rest as much as possible.  · Sleep with your head raised (elevated).  · Make sure to get enough sleep each night.  General instructions  · Put a warm, moist washcloth on your face 3-4 times a day, or as often as told by your doctor. This will help with discomfort.  · Wash your hands often with soap and water. If there is no soap and water, use hand .  · Do not smoke. Avoid being around people who are smoking (secondhand smoke).  · Keep all follow-up visits as told by your  doctor. This is important.  Contact a doctor if:  · You have a fever.  · Your symptoms get worse.  · Your symptoms do not get better within 10 days.  Get help right away if:  · You have a very bad headache.  · You cannot stop throwing up (vomiting).  · You have pain or swelling around your face or eyes.  · You have trouble seeing.  · You feel confused.  · Your neck is stiff.  · You have trouble breathing.  This information is not intended to replace advice given to you by your health care provider. Make sure you discuss any questions you have with your health care provider.  Document Released: 06/05/2009 Document Revised: 06/29/2018 Document Reviewed: 10/12/2016  [employer] Interactive Patient Education © 2019 [employer]  Acute Bronchitis, Adult    Acute bronchitis is sudden (acute) swelling of the air tubes (bronchi) in the lungs. Acute bronchitis causes these tubes to fill with mucus, which can make it hard to breathe. It can also cause coughing or wheezing.  In adults, acute bronchitis usually goes away within 2 weeks. A cough caused by bronchitis may last up to 3 weeks. Smoking, allergies, and asthma can make the condition worse. Repeated episodes of bronchitis may cause further lung problems, such as chronic obstructive pulmonary disease (COPD).  What are the causes?  This condition can be caused by germs and by substances that irritate the lungs, including:  · Cold and flu viruses. This condition is most often caused by the same virus that causes a cold.  · Bacteria.  · Exposure to tobacco smoke, dust, fumes, and air pollution.  What increases the risk?  This condition is more likely to develop in people who:  · Have close contact with someone with acute bronchitis.  · Are exposed to lung irritants, such as tobacco smoke, dust, fumes, and vapors.  · Have a weak immune system.  · Have a respiratory condition such as asthma.  What are the signs or symptoms?  Symptoms of this condition include:  · A  cough.  · Coughing up clear, yellow, or green mucus.  · Wheezing.  · Chest congestion.  · Shortness of breath.  · A fever.  · Body aches.  · Chills.  · A sore throat.  How is this diagnosed?  This condition is usually diagnosed with a physical exam. During the exam, your health care provider may order tests, such as chest X-rays, to rule out other conditions. He or she may also:  · Test a sample of your mucus for bacterial infection.  · Check the level of oxygen in your blood. This is done to check for pneumonia.  · Do a chest X-ray or lung function testing to rule out pneumonia and other conditions.  · Perform blood tests.  Your health care provider will also ask about your symptoms and medical history.  How is this treated?  Most cases of acute bronchitis clear up over time without treatment. Your health care provider may recommend:  · Drinking more fluids. Drinking more makes your mucus thinner, which may make it easier to breathe.  · Taking a medicine for a fever or cough.  · Taking an antibiotic medicine.  · Using an inhaler to help improve shortness of breath and to control a cough.  · Using a cool mist vaporizer or humidifier to make it easier to breathe.  Follow these instructions at home:  Medicines  · Take over-the-counter and prescription medicines only as told by your health care provider.  · If you were prescribed an antibiotic, take it as told by your health care provider. Do not stop taking the antibiotic even if you start to feel better.  General instructions    · Get plenty of rest.  · Drink enough fluids to keep your urine pale yellow.  · Avoid smoking and secondhand smoke. Exposure to cigarette smoke or irritating chemicals will make bronchitis worse. If you smoke and you need help quitting, ask your health care provider. Quitting smoking will help your lungs heal faster.  · Use an inhaler, cool mist vaporizer, or humidifier as told by your health care provider.  · Keep all follow-up visits as  told by your health care provider. This is important.  How is this prevented?  To lower your risk of getting this condition again:  · Wash your hands often with soap and water. If soap and water are not available, use hand .  · Avoid contact with people who have cold symptoms.  · Try not to touch your hands to your mouth, nose, or eyes.  · Make sure to get the flu shot every year.  Contact a health care provider if:  · Your symptoms do not improve in 2 weeks of treatment.  Get help right away if:  · You cough up blood.  · You have chest pain.  · You have severe shortness of breath.  · You become dehydrated.  · You faint or keep feeling like you are going to faint.  · You keep vomiting.  · You have a severe headache.  · Your fever or chills gets worse.  This information is not intended to replace advice given to you by your health care provider. Make sure you discuss any questions you have with your health care provider.  Document Released: 01/25/2006 Document Revised: 08/01/2018 Document Reviewed: 06/07/2017  [employer] Interactive Patient Education © 2019 [employer] Inc.

## 2019-10-03 NOTE — PROGRESS NOTES
"Subjective   Deepak Albarran is a 44 y.o. male.   Chief Complaint   Patient presents with   • Cough     congestion x 1 week    walk in acute visit   Cough   This is a new problem. The current episode started in the past 7 days. The problem has been gradually worsening. The cough is productive of purulent sputum. Associated symptoms include chills, ear congestion, a fever, headaches, myalgias, nasal congestion, a sore throat and wheezing. The symptoms are aggravated by lying down. Treatments tried: ibuprofen, mucinex  The treatment provided mild relief. His past medical history is significant for bronchitis and environmental allergies.        The following portions of the patient's history were reviewed and updated as appropriate: allergies, current medications, past family history, past medical history, past social history, past surgical history and problem list.    Review of Systems   Constitutional: Positive for activity change, appetite change, chills and fever.   HENT: Positive for congestion and sore throat.    Respiratory: Positive for cough and wheezing.    Cardiovascular: Negative.    Gastrointestinal: Negative.    Musculoskeletal: Positive for myalgias.        Generalized body aches    Skin: Negative.    Allergic/Immunologic: Positive for environmental allergies.   Neurological: Positive for headaches.   Hematological: Negative.    Psychiatric/Behavioral: Negative.      Past Surgical History:   Procedure Laterality Date   • LASIK Bilateral 2004     Past Medical History:   Diagnosis Date   • Gout    • HTN (hypertension)        Objective   No Known Allergies  Visit Vitals  /88   Pulse 80   Temp 98.3 °F (36.8 °C)   Ht 185.4 cm (73\")   Wt 95.7 kg (211 lb)   BMI 27.84 kg/m²       Physical Exam    Assessment/Plan   Deepak was seen today for cough.    Diagnoses and all orders for this visit:    Bronchitis  -     ipratropium-albuterol (DUO-NEB) nebulizer solution 3 mL    Acute maxillary sinusitis, recurrence " not specified    Other orders  -     albuterol sulfate  (90 Base) MCG/ACT inhaler; Inhale 2 puffs Every 4 (Four) Hours As Needed for Wheezing or Shortness of Air (or cough you cannont get under control) for up to 30 days.  -     cephalexin (KEFLEX) 500 MG capsule; Take 1 capsule by mouth 2 (Two) Times a Day for 10 days.  -     predniSONE (DELTASONE) 10 MG (21) tablet pack; Use as directed on package  -     promethazine-dextromethorphan (PROMETHAZINE-DM) 6.25-15 MG/5ML syrup; Take 5 mL by mouth 4 (Four) Times a Day As Needed for Cough.      Gargle warm salt water 1/4 teaspoon salt in 4 oz.warm water twice daily as needed.   Change toothbrush in 1 week   Avoid dairy products - this can increase your congestion.     Follow up with your PCP as needed.   See sinusitis and bronchitis patient instructions          Patient Instructions   Sinusitis, Adult  Sinusitis is soreness and swelling (inflammation) of your sinuses. Sinuses are hollow spaces in the bones around your face. They are located:  · Around your eyes.  · In the middle of your forehead.  · Behind your nose.  · In your cheekbones.  Your sinuses and nasal passages are lined with a stringy fluid (mucus). Mucus normally drains out of your sinuses. Swelling can trap mucus in your sinuses. This lets germs like bacteria or viruses grow, and that leads to infection. Most of the time, sinusitis is caused by a virus.  If bacteria is causing your infection, your doctor may have you wait and see if you get better without antibiotic medicine. You may be prescribed antibiotics if you have:  · A very bad infection.  · A weak disease-fighting (immune) system.  Follow these instructions at home:  Medicines  · Take, use, or apply over-the-counter and prescription medicines only as told by your doctor. These may include nasal sprays.  · If you were prescribed an antibiotic, take it as told by your doctor. Do not stop taking the antibiotic even if you start to feel  better.  Hydrate and Humidify  · Drink enough water to keep your pee (urine) pale yellow.  · Use a cool mist humidifier to keep the humidity level in your home above 50%.  · Breathe in steam for 10-15 minutes, 3-4 times a day or as told by your doctor. You can do this in the bathroom while a hot shower is running.  · Try not to spend time in cool or dry air.  Rest  · Rest as much as possible.  · Sleep with your head raised (elevated).  · Make sure to get enough sleep each night.  General instructions  · Put a warm, moist washcloth on your face 3-4 times a day, or as often as told by your doctor. This will help with discomfort.  · Wash your hands often with soap and water. If there is no soap and water, use hand .  · Do not smoke. Avoid being around people who are smoking (secondhand smoke).  · Keep all follow-up visits as told by your doctor. This is important.  Contact a doctor if:  · You have a fever.  · Your symptoms get worse.  · Your symptoms do not get better within 10 days.  Get help right away if:  · You have a very bad headache.  · You cannot stop throwing up (vomiting).  · You have pain or swelling around your face or eyes.  · You have trouble seeing.  · You feel confused.  · Your neck is stiff.  · You have trouble breathing.  This information is not intended to replace advice given to you by your health care provider. Make sure you discuss any questions you have with your health care provider.  Document Released: 06/05/2009 Document Revised: 06/29/2018 Document Reviewed: 10/12/2016  [employer] Interactive Patient Education © 2019 [employer]  Acute Bronchitis, Adult    Acute bronchitis is sudden (acute) swelling of the air tubes (bronchi) in the lungs. Acute bronchitis causes these tubes to fill with mucus, which can make it hard to breathe. It can also cause coughing or wheezing.  In adults, acute bronchitis usually goes away within 2 weeks. A cough caused by bronchitis may last up to 3 weeks.  Smoking, allergies, and asthma can make the condition worse. Repeated episodes of bronchitis may cause further lung problems, such as chronic obstructive pulmonary disease (COPD).  What are the causes?  This condition can be caused by germs and by substances that irritate the lungs, including:  · Cold and flu viruses. This condition is most often caused by the same virus that causes a cold.  · Bacteria.  · Exposure to tobacco smoke, dust, fumes, and air pollution.  What increases the risk?  This condition is more likely to develop in people who:  · Have close contact with someone with acute bronchitis.  · Are exposed to lung irritants, such as tobacco smoke, dust, fumes, and vapors.  · Have a weak immune system.  · Have a respiratory condition such as asthma.  What are the signs or symptoms?  Symptoms of this condition include:  · A cough.  · Coughing up clear, yellow, or green mucus.  · Wheezing.  · Chest congestion.  · Shortness of breath.  · A fever.  · Body aches.  · Chills.  · A sore throat.  How is this diagnosed?  This condition is usually diagnosed with a physical exam. During the exam, your health care provider may order tests, such as chest X-rays, to rule out other conditions. He or she may also:  · Test a sample of your mucus for bacterial infection.  · Check the level of oxygen in your blood. This is done to check for pneumonia.  · Do a chest X-ray or lung function testing to rule out pneumonia and other conditions.  · Perform blood tests.  Your health care provider will also ask about your symptoms and medical history.  How is this treated?  Most cases of acute bronchitis clear up over time without treatment. Your health care provider may recommend:  · Drinking more fluids. Drinking more makes your mucus thinner, which may make it easier to breathe.  · Taking a medicine for a fever or cough.  · Taking an antibiotic medicine.  · Using an inhaler to help improve shortness of breath and to control a  cough.  · Using a cool mist vaporizer or humidifier to make it easier to breathe.  Follow these instructions at home:  Medicines  · Take over-the-counter and prescription medicines only as told by your health care provider.  · If you were prescribed an antibiotic, take it as told by your health care provider. Do not stop taking the antibiotic even if you start to feel better.  General instructions    · Get plenty of rest.  · Drink enough fluids to keep your urine pale yellow.  · Avoid smoking and secondhand smoke. Exposure to cigarette smoke or irritating chemicals will make bronchitis worse. If you smoke and you need help quitting, ask your health care provider. Quitting smoking will help your lungs heal faster.  · Use an inhaler, cool mist vaporizer, or humidifier as told by your health care provider.  · Keep all follow-up visits as told by your health care provider. This is important.  How is this prevented?  To lower your risk of getting this condition again:  · Wash your hands often with soap and water. If soap and water are not available, use hand .  · Avoid contact with people who have cold symptoms.  · Try not to touch your hands to your mouth, nose, or eyes.  · Make sure to get the flu shot every year.  Contact a health care provider if:  · Your symptoms do not improve in 2 weeks of treatment.  Get help right away if:  · You cough up blood.  · You have chest pain.  · You have severe shortness of breath.  · You become dehydrated.  · You faint or keep feeling like you are going to faint.  · You keep vomiting.  · You have a severe headache.  · Your fever or chills gets worse.  This information is not intended to replace advice given to you by your health care provider. Make sure you discuss any questions you have with your health care provider.  Document Released: 01/25/2006 Document Revised: 08/01/2018 Document Reviewed: 06/07/2017  [employer] Interactive Patient Education © 2019 [employer]        Caity Evangelista, APRN

## 2020-06-26 PROCEDURE — U0003 INFECTIOUS AGENT DETECTION BY NUCLEIC ACID (DNA OR RNA); SEVERE ACUTE RESPIRATORY SYNDROME CORONAVIRUS 2 (SARS-COV-2) (CORONAVIRUS DISEASE [COVID-19]), AMPLIFIED PROBE TECHNIQUE, MAKING USE OF HIGH THROUGHPUT TECHNOLOGIES AS DESCRIBED BY CMS-2020-01-R: HCPCS | Performed by: NURSE PRACTITIONER

## 2020-06-29 ENCOUNTER — TELEPHONE (OUTPATIENT)
Dept: URGENT CARE | Facility: CLINIC | Age: 45
End: 2020-06-29

## 2020-07-06 ENCOUNTER — OFFICE VISIT (OUTPATIENT)
Dept: FAMILY MEDICINE CLINIC | Facility: CLINIC | Age: 45
End: 2020-07-06

## 2020-07-06 VITALS
OXYGEN SATURATION: 97 % | HEART RATE: 92 BPM | WEIGHT: 212.8 LBS | HEIGHT: 73 IN | BODY MASS INDEX: 28.2 KG/M2 | RESPIRATION RATE: 18 BRPM | SYSTOLIC BLOOD PRESSURE: 138 MMHG | DIASTOLIC BLOOD PRESSURE: 80 MMHG | TEMPERATURE: 98.7 F

## 2020-07-06 DIAGNOSIS — Z00.00 WELL ADULT EXAM: Primary | ICD-10-CM

## 2020-07-06 DIAGNOSIS — I10 ESSENTIAL HYPERTENSION: ICD-10-CM

## 2020-07-06 DIAGNOSIS — M1A.0790 CHRONIC IDIOPATHIC GOUT INVOLVING TOE WITHOUT TOPHUS, UNSPECIFIED LATERALITY: ICD-10-CM

## 2020-07-06 PROBLEM — M10.9 GOUT: Status: ACTIVE | Noted: 2020-07-06

## 2020-07-06 LAB
ALBUMIN SERPL-MCNC: 4.6 G/DL (ref 3.5–5.2)
ALBUMIN/GLOB SERPL: 1.9 G/DL
ALP SERPL-CCNC: 84 U/L (ref 39–117)
ALT SERPL W P-5'-P-CCNC: 44 U/L (ref 1–41)
ANION GAP SERPL CALCULATED.3IONS-SCNC: 9.1 MMOL/L (ref 5–15)
AST SERPL-CCNC: 28 U/L (ref 1–40)
BASOPHILS # BLD AUTO: 0.02 10*3/MM3 (ref 0–0.2)
BASOPHILS NFR BLD AUTO: 0.5 % (ref 0–1.5)
BILIRUB SERPL-MCNC: 1 MG/DL (ref 0–1.2)
BUN SERPL-MCNC: 14 MG/DL (ref 6–20)
BUN/CREAT SERPL: 13.1 (ref 7–25)
CALCIUM SPEC-SCNC: 9.8 MG/DL (ref 8.6–10.5)
CHLORIDE SERPL-SCNC: 101 MMOL/L (ref 98–107)
CHOLEST SERPL-MCNC: 185 MG/DL (ref 0–200)
CO2 SERPL-SCNC: 27.9 MMOL/L (ref 22–29)
CREAT SERPL-MCNC: 1.07 MG/DL (ref 0.76–1.27)
DEPRECATED RDW RBC AUTO: 45.6 FL (ref 37–54)
EOSINOPHIL # BLD AUTO: 0.03 10*3/MM3 (ref 0–0.4)
EOSINOPHIL NFR BLD AUTO: 0.7 % (ref 0.3–6.2)
ERYTHROCYTE [DISTWIDTH] IN BLOOD BY AUTOMATED COUNT: 12.9 % (ref 12.3–15.4)
GFR SERPL CREATININE-BSD FRML MDRD: 74 ML/MIN/1.73
GLOBULIN UR ELPH-MCNC: 2.4 GM/DL
GLUCOSE SERPL-MCNC: 113 MG/DL (ref 65–99)
HCT VFR BLD AUTO: 48.9 % (ref 37.5–51)
HDLC SERPL-MCNC: 38 MG/DL (ref 40–60)
HGB BLD-MCNC: 17.3 G/DL (ref 13–17.7)
IMM GRANULOCYTES # BLD AUTO: 0.01 10*3/MM3 (ref 0–0.05)
IMM GRANULOCYTES NFR BLD AUTO: 0.2 % (ref 0–0.5)
LDLC SERPL CALC-MCNC: 94 MG/DL (ref 0–100)
LDLC/HDLC SERPL: 2.47 {RATIO}
LYMPHOCYTES # BLD AUTO: 1 10*3/MM3 (ref 0.7–3.1)
LYMPHOCYTES NFR BLD AUTO: 22.8 % (ref 19.6–45.3)
MCH RBC QN AUTO: 34.1 PG (ref 26.6–33)
MCHC RBC AUTO-ENTMCNC: 35.4 G/DL (ref 31.5–35.7)
MCV RBC AUTO: 96.3 FL (ref 79–97)
MONOCYTES # BLD AUTO: 0.38 10*3/MM3 (ref 0.1–0.9)
MONOCYTES NFR BLD AUTO: 8.7 % (ref 5–12)
NEUTROPHILS NFR BLD AUTO: 2.94 10*3/MM3 (ref 1.7–7)
NEUTROPHILS NFR BLD AUTO: 67.1 % (ref 42.7–76)
NRBC BLD AUTO-RTO: 0 /100 WBC (ref 0–0.2)
PLATELET # BLD AUTO: 151 10*3/MM3 (ref 140–450)
PMV BLD AUTO: 10 FL (ref 6–12)
POTASSIUM SERPL-SCNC: 4.9 MMOL/L (ref 3.5–5.2)
PROT SERPL-MCNC: 7 G/DL (ref 6–8.5)
RBC # BLD AUTO: 5.08 10*6/MM3 (ref 4.14–5.8)
SODIUM SERPL-SCNC: 138 MMOL/L (ref 136–145)
TRIGL SERPL-MCNC: 266 MG/DL (ref 0–150)
TSH SERPL DL<=0.05 MIU/L-ACNC: 1.54 UIU/ML (ref 0.27–4.2)
VLDLC SERPL-MCNC: 53.2 MG/DL (ref 5–40)
WBC # BLD AUTO: 4.38 10*3/MM3 (ref 3.4–10.8)

## 2020-07-06 PROCEDURE — 80053 COMPREHEN METABOLIC PANEL: CPT | Performed by: FAMILY MEDICINE

## 2020-07-06 PROCEDURE — 86803 HEPATITIS C AB TEST: CPT | Performed by: FAMILY MEDICINE

## 2020-07-06 PROCEDURE — 80061 LIPID PANEL: CPT | Performed by: FAMILY MEDICINE

## 2020-07-06 PROCEDURE — 84443 ASSAY THYROID STIM HORMONE: CPT | Performed by: FAMILY MEDICINE

## 2020-07-06 PROCEDURE — 85025 COMPLETE CBC W/AUTO DIFF WBC: CPT | Performed by: FAMILY MEDICINE

## 2020-07-06 PROCEDURE — 99396 PREV VISIT EST AGE 40-64: CPT | Performed by: FAMILY MEDICINE

## 2020-07-06 PROCEDURE — 83036 HEMOGLOBIN GLYCOSYLATED A1C: CPT | Performed by: FAMILY MEDICINE

## 2020-07-06 RX ORDER — LOSARTAN POTASSIUM 100 MG/1
100 TABLET ORAL DAILY
Qty: 90 TABLET | Refills: 3 | Status: SHIPPED | OUTPATIENT
Start: 2020-07-06 | End: 2021-07-08 | Stop reason: SDUPTHER

## 2020-07-06 RX ORDER — ALLOPURINOL 300 MG/1
300 TABLET ORAL DAILY
Qty: 90 TABLET | Refills: 3 | Status: SHIPPED | OUTPATIENT
Start: 2020-07-06 | End: 2021-07-08 | Stop reason: SDUPTHER

## 2020-07-06 NOTE — PATIENT INSTRUCTIONS
Preventive Care 40-64 Years Old, Male  Preventive care refers to lifestyle choices and visits with your health care provider that can promote health and wellness. This includes:  · A yearly physical exam. This is also called an annual well check.  · Regular dental and eye exams.  · Immunizations.  · Screening for certain conditions.  · Healthy lifestyle choices, such as eating a healthy diet, getting regular exercise, not using drugs or products that contain nicotine and tobacco, and limiting alcohol use.  What can I expect for my preventive care visit?  Physical exam  Your health care provider will check:  · Height and weight. These may be used to calculate body mass index (BMI), which is a measurement that tells if you are at a healthy weight.  · Heart rate and blood pressure.  · Your skin for abnormal spots.  Counseling  Your health care provider may ask you questions about:  · Alcohol, tobacco, and drug use.  · Emotional well-being.  · Home and relationship well-being.  · Sexual activity.  · Eating habits.  · Work and work environment.  What immunizations do I need?    Influenza (flu) vaccine  · This is recommended every year.  Tetanus, diphtheria, and pertussis (Tdap) vaccine  · You may need a Td booster every 10 years.  Varicella (chickenpox) vaccine  · You may need this vaccine if you have not already been vaccinated.  Zoster (shingles) vaccine  · You may need this after age 60.  Measles, mumps, and rubella (MMR) vaccine  · You may need at least one dose of MMR if you were born in 1957 or later. You may also need a second dose.  Pneumococcal conjugate (PCV13) vaccine  · You may need this if you have certain conditions and were not previously vaccinated.  Pneumococcal polysaccharide (PPSV23) vaccine  · You may need one or two doses if you smoke cigarettes or if you have certain conditions.  Meningococcal conjugate (MenACWY) vaccine  · You may need this if you have certain conditions.  Hepatitis A  vaccine  · You may need this if you have certain conditions or if you travel or work in places where you may be exposed to hepatitis A.  Hepatitis B vaccine  · You may need this if you have certain conditions or if you travel or work in places where you may be exposed to hepatitis B.  Haemophilus influenzae type b (Hib) vaccine  · You may need this if you have certain risk factors.  Human papillomavirus (HPV) vaccine  · If recommended by your health care provider, you may need three doses over 6 months.  You may receive vaccines as individual doses or as more than one vaccine together in one shot (combination vaccines). Talk with your health care provider about the risks and benefits of combination vaccines.  What tests do I need?  Blood tests  · Lipid and cholesterol levels. These may be checked every 5 years, or more frequently if you are over 50 years old.  · Hepatitis C test.  · Hepatitis B test.  Screening  · Lung cancer screening. You may have this screening every year starting at age 55 if you have a 30-pack-year history of smoking and currently smoke or have quit within the past 15 years.  · Prostate cancer screening. Recommendations will vary depending on your family history and other risks.  · Colorectal cancer screening. All adults should have this screening starting at age 50 and continuing until age 75. Your health care provider may recommend screening at age 45 if you are at increased risk. You will have tests every 1-10 years, depending on your results and the type of screening test.  · Diabetes screening. This is done by checking your blood sugar (glucose) after you have not eaten for a while (fasting). You may have this done every 1-3 years.  · Sexually transmitted disease (STD) testing.  Follow these instructions at home:  Eating and drinking  · Eat a diet that includes fresh fruits and vegetables, whole grains, lean protein, and low-fat dairy products.  · Take vitamin and mineral supplements as  recommended by your health care provider.  · Do not drink alcohol if your health care provider tells you not to drink.  · If you drink alcohol:  ? Limit how much you have to 0-2 drinks a day.  ? Be aware of how much alcohol is in your drink. In the [place] one drink equals one 12 oz bottle of beer (355 mL), one 5 oz glass of wine (148 mL), or one 1½ oz glass of hard liquor (44 mL).  Lifestyle  · Take daily care of your teeth and gums.  · Stay active. Exercise for at least 30 minutes on 5 or more days each week.  · Do not use any products that contain nicotine or tobacco, such as cigarettes, e-cigarettes, and chewing tobacco. If you need help quitting, ask your health care provider.  · If you are sexually active, practice safe sex. Use a condom or other form of protection to prevent STIs (sexually transmitted infections).  · Talk with your health care provider about taking a low-dose aspirin every day starting at age 50.  What's next?  · Go to your health care provider once a year for a well check visit.  · Ask your health care provider how often you should have your eyes and teeth checked.  · Stay up to date on all vaccines.  This information is not intended to replace advice given to you by your health care provider. Make sure you discuss any questions you have with your health care provider.  Document Released: 01/13/2017 Document Revised: 12/12/2019 Document Reviewed: 12/12/2019  [employer] Patient Education © 2020 [employer]

## 2020-07-06 NOTE — PROGRESS NOTES
Deepak Albarran is a 45 y.o. male who presents today to establish care and complete annual exam.    Chief Complaint   Patient presents with   • Establish Care   • Annual Exam        Patient is here to establish care and complete annual wellness exam.  He eats a varied and healthy diet and exercises daily for the most part.  Patient currently being treated for hypertension and gout both of which have been chronic and well controlled for a long time.  His hypertension has most recently been under control when he had his blood pressure checked at the [facility] treatment center for upper respiratory illness.  He was out of his medications at that time.  Patient sees optometrist and dentist regularly.  He has not followed regularly with dermatologist but will set up an appointment.  Patient has no acute complaints today.       Review of Systems   Constitutional: Negative for fever and unexpected weight loss.   HENT: Negative for congestion, ear pain and sore throat.    Eyes: Negative for visual disturbance.   Respiratory: Negative for cough, shortness of breath and wheezing.    Cardiovascular: Negative for chest pain and palpitations.   Gastrointestinal: Negative for abdominal pain, blood in stool, constipation, diarrhea, nausea, vomiting and GERD.   Endocrine: Negative for polydipsia and polyuria.   Genitourinary: Negative for difficulty urinating.   Musculoskeletal: Negative for joint swelling.   Skin: Negative for rash and skin lesions.   Allergic/Immunologic: Negative for environmental allergies.   Neurological: Negative for seizures and syncope.   Hematological: Does not bruise/bleed easily.   Psychiatric/Behavioral: Negative for suicidal ideas.        PHQ-9 Depression Screening  Little interest or pleasure in doing things? 0   Feeling down, depressed, or hopeless? 0   Trouble falling or staying asleep, or sleeping too much?     Feeling tired or having little energy?     Poor appetite or overeating?     Feeling bad  about yourself - or that you are a failure or have let yourself or your family down?     Trouble concentrating on things, such as reading the newspaper or watching television?     Moving or speaking so slowly that other people could have noticed? Or the opposite - being so fidgety or restless that you have been moving around a lot more than usual?     Thoughts that you would be better off dead, or of hurting yourself in some way?     PHQ-9 Total Score 0   If you checked off any problems, how difficult have these problems made it for you to do your work, take care of things at home, or get along with other people?         Past Medical History:   Diagnosis Date   • Gout    • HTN (hypertension)         Past Surgical History:   Procedure Laterality Date   • LASIK Bilateral    • LIPOMA RESECTION     • WISDOM TOOTH EXTRACTION          Family History   Problem Relation Age of Onset   • No Known Problems Mother    • Osteoarthritis Father    • Hearing loss Father    • No Known Problems Sister    • Epilepsy Son    • Leukemia Maternal Grandmother    • Other Maternal Grandfather         unknown   • Lung cancer Paternal Grandmother    • Other Paternal Grandfather         unknown        Social History     Socioeconomic History   • Marital status: Single     Spouse name: N/A   • Number of children: 1   • Years of education: College   • Highest education level: Bachelor's degree (e.g., BA, AB, BS)   Occupational History   • Occupation: Insurance Sales     Employer: [employer]   Tobacco Use   • Smoking status: Former Smoker     Packs/day: 1.00     Years: 10.00     Pack years: 10.00     Types: Cigarettes     Last attempt to quit: 2003     Years since quittin.0   • Smokeless tobacco: Current User   Substance and Sexual Activity   • Alcohol use: Yes     Alcohol/week: 24.0 standard drinks     Types: 12 Glasses of wine, 12 Shots of liquor per week     Frequency: 4 or more times a week     Drinks per session: 3 or  "4   • Drug use: No   • Sexual activity: Yes     Partners: Female     Birth control/protection: Post-menopausal     Comment: 5 female partners in the last year        Current Outpatient Medications on File Prior to Visit   Medication Sig Dispense Refill   • [DISCONTINUED] allopurinol (ZYLOPRIM) 300 MG tablet Take 1 tablet by mouth Daily. 90 tablet 3   • [DISCONTINUED] losartan (COZAAR) 100 MG tablet Take 1 tablet by mouth Daily. 90 tablet 3     No current facility-administered medications on file prior to visit.        No Known Allergies     Visit Vitals  /80 (BP Location: Right arm, Patient Position: Sitting, Cuff Size: Adult)   Pulse 92   Temp 98.7 °F (37.1 °C) (Temporal)   Resp 18   Ht 185.4 cm (73\")   Wt 96.5 kg (212 lb 12.8 oz)   SpO2 97%   BMI 28.08 kg/m²      Body mass index is 28.08 kg/m².    Physical Exam   Constitutional: He is oriented to person, place, and time. He appears well-developed and well-nourished. No distress.   HENT:   Head: Atraumatic.   Eyes: EOM are normal.   Neck: Normal range of motion. Neck supple.   Cardiovascular: Normal rate, regular rhythm, normal heart sounds and intact distal pulses. Exam reveals no gallop and no friction rub.   No murmur heard.  Pulmonary/Chest: Effort normal and breath sounds normal. No respiratory distress. He has no wheezes. He has no rales.   Abdominal: Soft. Bowel sounds are normal. He exhibits no distension. There is no tenderness.   Musculoskeletal: He exhibits no edema.   Neurological: He is alert and oriented to person, place, and time.   Skin: Skin is warm and dry. He is not diaphoretic.   Psychiatric: He has a normal mood and affect. His behavior is normal.        Results for orders placed or performed during the hospital encounter of 06/26/20   COVID-19,[facility] ROUTINE, NP/OP SWAB IN TRANSPORT MEDIA OR ESWAB 72 HR TAT - Swab, Oropharynx   Result Value Ref Range    SARS-CoV-2, MIR Not Detected Not Detected   COVID [facility] Priority - Swab, " Oropharynx   Result Value Ref Range    COVID [facility] PRIORITY Comment    POCT Rapid Strep A   Result Value Ref Range    Rapid Strep A Screen Negative Negative, VALID, INVALID, Not Performed    Internal Control Passed Passed    Lot Number [id number]     Expiration Date 2,022         Problems Addressed this Visit        Cardiovascular and Mediastinum    HTN (hypertension)    Relevant Medications    losartan (COZAAR) 100 MG tablet       Other    Gout    Relevant Medications    allopurinol (ZYLOPRIM) 300 MG tablet    Well adult exam - Primary     The patient is here for health maintenance visit.  Currently, the patient consumes a healthy diet and has an adequate exercise regimen.  Screening lab work is ordered.  Immunizations were reviewed today.  Advice and education was given regarding nutrition, aerobic exercise, routine dental evaluations, routine eye exams, reproductive health, cardiovascular risk reduction, sunscreen use, self skin examination (annual dermatology evaluations) and seatbelt use (general overall safety).  Further recommendations will be given if needed after lab evaluation.  Annual wellness evaluation is recommended.           Relevant Orders    CBC & Differential    Comprehensive Metabolic Panel    Hemoglobin A1c    Lipid Panel    TSH Rfx On Abnormal To Free T4    Uric acid    Hepatitis C Antibody    CBC Auto Differential          Return in about 1 year (around 7/6/2021) for Annual.    Parts of this office note have been dictated by voice recognition software. Grammatical and/or spelling errors may be present.     Nii Child MD  7/6/2020

## 2020-07-07 LAB
HBA1C MFR BLD: 4.9 % (ref 4.8–5.6)
HCV AB SER DONR QL: NORMAL

## 2020-07-08 ENCOUNTER — TELEPHONE (OUTPATIENT)
Dept: FAMILY MEDICINE CLINIC | Facility: CLINIC | Age: 45
End: 2020-07-08

## 2020-07-08 NOTE — TELEPHONE ENCOUNTER
----- Message from Nii Child MD sent at 7/7/2020  6:18 PM EDT -----  Please let the patient know that labs that were drawn at previous visit were unremarkable. Patient should continue current treatment plan.  If patient has any questions they can contact me.

## 2020-07-08 NOTE — TELEPHONE ENCOUNTER
Informed pt of recent lab results. Pt voiced understanding. Advised to call back with any questions.

## 2020-08-13 ENCOUNTER — OFFICE VISIT (OUTPATIENT)
Dept: FAMILY MEDICINE CLINIC | Facility: CLINIC | Age: 45
End: 2020-08-13

## 2020-08-13 VITALS
HEART RATE: 97 BPM | BODY MASS INDEX: 28.6 KG/M2 | HEIGHT: 73 IN | OXYGEN SATURATION: 98 % | TEMPERATURE: 97.7 F | SYSTOLIC BLOOD PRESSURE: 116 MMHG | DIASTOLIC BLOOD PRESSURE: 70 MMHG | WEIGHT: 215.8 LBS

## 2020-08-13 DIAGNOSIS — L03.011 PARONYCHIA OF RIGHT MIDDLE FINGER: Primary | ICD-10-CM

## 2020-08-13 PROCEDURE — 99213 OFFICE O/P EST LOW 20 MIN: CPT | Performed by: NURSE PRACTITIONER

## 2020-08-13 RX ORDER — CEPHALEXIN 500 MG/1
500 CAPSULE ORAL 2 TIMES DAILY
Qty: 20 CAPSULE | Refills: 0 | Status: SHIPPED | OUTPATIENT
Start: 2020-08-13 | End: 2020-08-23

## 2020-08-13 NOTE — PATIENT INSTRUCTIONS
Paronychia  Paronychia is an infection of the skin that surrounds a nail. It usually affects the skin around a fingernail, but it may also occur near a toenail. It often causes pain and swelling around the nail. In some cases, a collection of pus (abscess) can form near or under the nail.   This condition may develop suddenly, or it may develop gradually over a longer period. In most cases, paronychia is not serious, and it will clear up with treatment.  What are the causes?  This condition may be caused by bacteria or a fungus. These germs can enter the body through an opening in the skin, such as a cut or a hangnail.  What increases the risk?  This condition is more likely to develop in people who:  · Get their hands wet often, such as those who work as dishwashers, , or nurses.  · Bite their fingernails or suck their thumbs.  · Trim their nails very short.  · Have hangnails or injured fingertips.  · Get manicures.  · Have diabetes.  What are the signs or symptoms?  Symptoms of this condition include:  · Redness and swelling of the skin near the nail.  · Tenderness around the nail when you touch the area.  · Pus-filled bumps under the skin at the base and sides of the nail (cuticle).  · Fluid or pus under the nail.  · Throbbing pain in the area.  How is this diagnosed?  This condition is diagnosed with a physical exam. In some cases, a sample of pus may be tested to determine what type of bacteria or fungus is causing the condition.  How is this treated?  Treatment depends on the cause and severity of your condition. If your condition is mild, it may clear up on its own in a few days or after soaking in warm water. If needed, treatment may include:  · Antibiotic medicine, if your infection is caused by bacteria.  · Antifungal medicine, if your infection is caused by a fungus.  · A procedure to drain pus from an abscess.  · Anti-inflammatory medicine (corticosteroids).  Follow these instructions at  home:  Wound care  · Keep the affected area clean.  · Soak the affected area in warm water, if told to do so by your health care provider. You may be told to do this for 20 minutes, 2-3 times a day.  · Keep the area dry when you are not soaking it.  · Do not try to drain an abscess yourself.  · Follow instructions from your health care provider about how to take care of the affected area. Make sure you:  ? Wash your hands with soap and water before you change your bandage (dressing). If soap and water are not available, use hand .  ? Change your dressing as told by your health care provider.  · If you had an abscess drained, check the area every day for signs of infection. Check for:  ? Redness, swelling, or pain.  ? Fluid or blood.  ? Warmth.  ? Pus or a bad smell.  Medicines    · Take over-the-counter and prescription medicines only as told by your health care provider.  · If you were prescribed an antibiotic medicine, take it as told by your health care provider. Do not stop taking the antibiotic even if you start to feel better.  General instructions  · Avoid contact with harsh chemicals.  · Do not pick at the affected area.  Prevention  · To prevent this condition from happening again:  ? Wear rubber gloves when washing dishes or doing other tasks that require your hands to get wet.  ? Wear gloves if your hands might come in contact with  or other chemicals.  ? Avoid injuring your nails or fingertips.  ? Do not bite your nails or tear hangnails.  ? Do not cut your nails very short.   ? Do not cut your cuticles.  ? Use clean nail clippers or scissors when trimming nails.  Contact a health care provider if:  · Your symptoms get worse or do not improve with treatment.  · You have continued or increased fluid, blood, or pus coming from the affected area.  · Your finger or knuckle becomes swollen or difficult to move.  Get help right away if you have:  · A fever or chills.  · Redness spreading away  from the affected area.  · Joint or muscle pain.  Summary  · Paronychia is an infection of the skin that surrounds a nail. It often causes pain and swelling around the nail. In some cases, a collection of pus (abscess) can form near or under the nail.  · This condition may be caused by bacteria or a fungus. These germs can enter the body through an opening in the skin, such as a cut or a hangnail.  · If your condition is mild, it may clear up on its own in a few days. If needed, treatment may include medicine or a procedure to drain pus from an abscess.  · To prevent this condition from happening again, wear gloves if doing tasks that require your hands to get wet or to come in contact with chemicals. Also avoid injuring your nails or fingertips.  This information is not intended to replace advice given to you by your health care provider. Make sure you discuss any questions you have with your health care provider.  Document Released: 06/13/2002 Document Revised: 01/04/2019 Document Reviewed: 12/31/2018  [employer] Patient Education © 2020 [employer]  Cephalexin tablets or capsules  What is this medicine?  CEPHALEXIN (sef a BELEN in) is a cephalosporin antibiotic. It is used to treat certain kinds of bacterial infections It will not work for colds, flu, or other viral infections.  This medicine may be used for other purposes; ask your health care provider or pharmacist if you have questions.  COMMON BRAND NAME(S): Biocef, Daxbia, Keflex, Keftab  What should I tell my health care provider before I take this medicine?  They need to know if you have any of these conditions:  · kidney disease  · stomach or intestine problems, especially colitis  · an unusual or allergic reaction to cephalexin, other cephalosporins, penicillins, other antibiotics, medicines, foods, dyes or preservatives  · pregnant or trying to get pregnant  · breast-feeding  How should I use this medicine?  Take this medicine by mouth with a full glass  of water. Follow the directions on the prescription label. This medicine can be taken with or without food. Take your medicine at regular intervals. Do not take your medicine more often than directed. Take all of your medicine as directed even if you think you are better. Do not skip doses or stop your medicine early.  Talk to your pediatrician regarding the use of this medicine in children. While this drug may be prescribed for selected conditions, precautions do apply.  Overdosage: If you think you have taken too much of this medicine contact a poison control center or emergency room at once.  NOTE: This medicine is only for you. Do not share this medicine with others.  What if I miss a dose?  If you miss a dose, take it as soon as you can. If it is almost time for your next dose, take only that dose. Do not take double or extra doses. There should be at least 4 to 6 hours between doses.  What may interact with this medicine?  · probenecid  · some other antibiotics  This list may not describe all possible interactions. Give your health care provider a list of all the medicines, herbs, non-prescription drugs, or dietary supplements you use. Also tell them if you smoke, drink alcohol, or use illegal drugs. Some items may interact with your medicine.  What should I watch for while using this medicine?  Tell your doctor or health care provider if your symptoms do not begin to improve in a few days.  This medicine may cause serious skin reactions. They can happen weeks to months after starting the medicine. Contact your health care provider right away if you notice fevers or flu-like symptoms with a rash. The rash may be red or purple and then turn into blisters or peeling of the skin. Or, you might notice a red rash with swelling of the face, lips or lymph nodes in your neck or under your arms.  Do not treat diarrhea with over the counter products. Contact your doctor if you have diarrhea that lasts more than 2 days  or if it is severe and watery.  If you have diabetes, you may get a false-positive result for sugar in your urine. Check with your doctor or health care provider.  What side effects may I notice from receiving this medicine?  Side effects that you should report to your doctor or health care professional as soon as possible:  · allergic reactions like skin rash, itching or hives, swelling of the face, lips, or tongue  · breathing problems  · pain or trouble passing urine  · redness, blistering, peeling or loosening of the skin, including inside the mouth  · severe or watery diarrhea  · unusually weak or tired  · yellowing of the eyes, skin  Side effects that usually do not require medical attention (report to your doctor or health care professional if they continue or are bothersome):  · gas or heartburn  · genital or anal irritation  · headache  · joint or muscle pain  · nausea, vomiting  This list may not describe all possible side effects. Call your doctor for medical advice about side effects. You may report side effects to FDA at 7-736-NUL-8374.  Where should I keep my medicine?  Keep out of the reach of children.  Store at room temperature between 59 and 86 degrees F (15 and 30 degrees C). Throw away any unused medicine after the expiration date.  NOTE: This sheet is a summary. It may not cover all possible information. If you have questions about this medicine, talk to your doctor, pharmacist, or health care provider.  © 2020 [employer]vier/Gold Standard (2020-03-27 07:00:28)    Wound Care, Adult  Taking care of your wound properly can help to prevent pain, infection, and scarring. It can also help your wound to heal more quickly.  How to care for your wound  Wound care         · Follow instructions from your health care provider about how to take care of your wound. Make sure you:  ? Wash your hands with soap and water before you change the bandage (dressing). If soap and water are not available, use hand  .  ? Change your dressing as told by your health care provider.  ? Leave stitches (sutures), skin glue, or adhesive strips in place. These skin closures may need to stay in place for 2 weeks or longer. If adhesive strip edges start to loosen and curl up, you may trim the loose edges. Do not remove adhesive strips completely unless your health care provider tells you to do that.  · Check your wound area every day for signs of infection. Check for:  ? Redness, swelling, or pain.  ? Fluid or blood.  ? Warmth.  ? Pus or a bad smell.  · Ask your health care provider if you should clean the wound with mild soap and water. Doing this may include:  ? Using a clean towel to pat the wound dry after cleaning it. Do not rub or scrub the wound.  ? Applying a cream or ointment. Do this only as told by your health care provider.  ? Covering the incision with a clean dressing.  · Ask your health care provider when you can leave the wound uncovered.  · Keep the dressing dry until your health care provider says it can be removed. Do not take baths, swim, use a hot tub, or do anything that would put the wound underwater until your health care provider approves. Ask your health care provider if you can take showers. You may only be allowed to take sponge baths.  Medicines    · If you were prescribed an antibiotic medicine, cream, or ointment, take or use the antibiotic as told by your health care provider. Do not stop taking or using the antibiotic even if your condition improves.  · Take over-the-counter and prescription medicines only as told by your health care provider. If you were prescribed pain medicine, take it 30 or more minutes before you do any wound care or as told by your health care provider.  General instructions  · Return to your normal activities as told by your health care provider. Ask your health care provider what activities are safe.  · Do not scratch or pick at the wound.  · Do not use any products that  contain nicotine or tobacco, such as cigarettes and e-cigarettes. These may delay wound healing. If you need help quitting, ask your health care provider.  · Keep all follow-up visits as told by your health care provider. This is important.  · Eat a diet that includes protein, vitamin A, vitamin C, and other nutrient-rich foods to help the wound heal.  ? Foods rich in protein include meat, dairy, beans, nuts, and other sources.  ? Foods rich in vitamin A include carrots and dark green, leafy vegetables.  ? Foods rich in vitamin C include citrus, tomatoes, and other fruits and vegetables.  ? Nutrient-rich foods have protein, carbohydrates, fat, vitamins, or minerals. Eat a variety of healthy foods including vegetables, fruits, and whole grains.  Contact a health care provider if:  · You received a tetanus shot and you have swelling, severe pain, redness, or bleeding at the injection site.  · Your pain is not controlled with medicine.  · You have redness, swelling, or pain around the wound.  · You have fluid or blood coming from the wound.  · Your wound feels warm to the touch.  · You have pus or a bad smell coming from the wound.  · You have a fever or chills.  · You are nauseous or you vomit.  · You are dizzy.  Get help right away if:  · You have a red streak going away from your wound.  · The edges of the wound open up and separate.  · Your wound is bleeding, and the bleeding does not stop with gentle pressure.  · You have a rash.  · You faint.  · You have trouble breathing.  Summary  · Always wash your hands with soap and water before changing your bandage (dressing).  · To help with healing, eat foods that are rich in protein, vitamin A, vitamin C, and other nutrients.  · Check your wound every day for signs of infection. Contact your health care provider if you suspect that your wound is infected.  This information is not intended to replace advice given to you by your health care provider. Make sure you discuss  any questions you have with your health care provider.  Document Released: 09/26/2009 Document Revised: 04/06/2020 Document Reviewed: 07/04/2017  [employer]vier Patient Education © 2020 [employer]

## 2020-08-15 ENCOUNTER — TELEPHONE (OUTPATIENT)
Dept: FAMILY MEDICINE CLINIC | Facility: CLINIC | Age: 45
End: 2020-08-15

## 2020-08-15 NOTE — PROGRESS NOTES
Subjective   Deepak Albarran is a 45 y.o. male.     Mr. Albarran presents today with c/o right middle finger red, swollen, painful. He denies injury/trauma.     Hand Pain    The incident occurred 2 days ago. The incident occurred at home. There was no injury mechanism. Pain location: right middle finger. The quality of the pain is described as aching. The pain does not radiate. The pain is moderate. The pain has been constant since the incident. Pertinent negatives include no chest pain, muscle weakness, numbness or tingling. The symptoms are aggravated by palpation. He has tried ice for the symptoms. The treatment provided no relief.       The following portions of the patient's history were reviewed and updated as appropriate: allergies, current medications, past family history, past medical history, past social history, past surgical history and problem list.    Allergies as of 08/13/2020   • (No Known Allergies)       Current Outpatient Medications on File Prior to Visit   Medication Sig   • allopurinol (ZYLOPRIM) 300 MG tablet Take 1 tablet by mouth Daily.   • losartan (COZAAR) 100 MG tablet Take 1 tablet by mouth Daily.     No current facility-administered medications on file prior to visit.        Review of Systems   Constitutional: Negative.    Respiratory: Negative.    Cardiovascular: Negative.  Negative for chest pain.   Gastrointestinal: Negative.    Musculoskeletal:        Right middle finger pain, swelling, redness   Neurological: Negative.  Negative for tingling and numbness.       Objective   Physical Exam   Constitutional: He is oriented to person, place, and time. Vital signs are normal. He appears well-developed and well-nourished. He is cooperative. He does not appear ill. No distress.   Cardiovascular: Normal rate, regular rhythm and normal heart sounds.   Pulmonary/Chest: Effort normal and breath sounds normal.   Neurological: He is alert and oriented to person, place, and time.   Skin: Skin is  warm, dry and intact. No rash noted. He is not diaphoretic.   Distal right middle finger erythematous, swollen, very tender to palpation most notably along lateral nail fold.    Psychiatric: He has a normal mood and affect. His behavior is normal. Judgment and thought content normal.   Vitals reviewed.    Vitals:    08/13/20 1406   BP: 116/70   Pulse: 97   Temp: 97.7 °F (36.5 °C)   SpO2: 98%     Body mass index is 28.47 kg/m².    Assessment/Plan   Deepak was seen today for edema.    Diagnoses and all orders for this visit:    Paronychia of right middle finger  -     cephalexin (KEFLEX) 500 MG capsule; Take 1 capsule by mouth 2 (Two) Times a Day for 10 days.  -     mupirocin (Bactroban) 2 % ointment; Apply  topically to the appropriate area as directed 3 (Three) Times a Day.     Incision & Drainage  Date/Time: 8/13/2020 2:00 PM  Performed by: Ladonna Figueroa APRN  Authorized by: Ladonna Figueroa APRN   Consent: Verbal consent obtained.  Risks and benefits: risks, benefits and alternatives were discussed  Consent given by: patient  Patient understanding: patient states understanding of the procedure being performed  Patient consent: the patient's understanding of the procedure matches consent given  Type: abscess  Location: right middle finger.  Anesthesia: local infiltration    Anesthesia:  Local Anesthetic: lidocaine 1% without epinephrine  Anesthetic total: 0.5 mL  Scalpel size: 11  Incision type: single straight  Drainage: serosanguinous  Drainage amount: moderate  Wound treatment: wound left open  Packing material: none  Patient tolerance: Patient tolerated the procedure well with no immediate complications        Discussed the nature of the medical condition(s) risks, complications, implications, management, safe and proper use of medications. Encouraged medication compliance, and keeping scheduled follow up appointments with me and any other providers.     RTC if symptoms fail to improve, to ER if symptoms  worsen.

## 2020-08-15 NOTE — TELEPHONE ENCOUNTER
Called to check how pt was doing after I&D of middle finger and pt stated he is still having extreme pain with it. I advised pt to go to the ER to have the finger lanced more extensively per Charles recommendation. Pt voiced understanding.

## 2020-08-26 ENCOUNTER — TELEPHONE (OUTPATIENT)
Dept: FAMILY MEDICINE CLINIC | Facility: CLINIC | Age: 45
End: 2020-08-26

## 2020-08-26 NOTE — TELEPHONE ENCOUNTER
Pt states his finger is feeling much better. Advised him if swelling comes back to call and schedule an appt with Dr. Child. Pt voiced understanding.

## 2020-08-26 NOTE — TELEPHONE ENCOUNTER
"----- Message from IRIS Mulligan sent at 8/21/2020  8:08 AM EDT -----  Regarding: RE: RETURN PHONE CALL  Contact: [phone]  He needs to either come in and let his PCP Dr. Child examine it or I can refer him to hand specialist.  ----- Message -----  From: Hilary Abdullahi CMA  Sent: 8/20/2020   4:32 PM EDT  To: IRIS Mulligan  Subject: FW: RETURN PHONE CALL                            Called pt to check to see if he went to ER for more extensive lancing and pt stated he did not go. He says his finger is still very painful still sending shocks of pain when touching things and he been soaking in peroxide and he stated it looked like it was trying to come to a head and so he tried to pop it but he couldn't stand the pain so he stopped. He stated there is a place that looks like a \"cut\" above his finger nail now. Any advice on what he should do. He does not want to go to the ER.  ----- Message -----  From: Michelle Cage  Sent: 8/20/2020   2:52 PM EDT  To: Hilary Abdullahi CMA  Subject: RETURN PHONE CALL                                Patient was returning your phone call.  Please call him back at your earliest convenience @ [phone].  Thank you.       "

## 2020-11-06 ENCOUNTER — TELEMEDICINE (OUTPATIENT)
Dept: FAMILY MEDICINE CLINIC | Facility: CLINIC | Age: 45
End: 2020-11-06

## 2020-11-06 ENCOUNTER — TELEPHONE (OUTPATIENT)
Dept: FAMILY MEDICINE CLINIC | Facility: CLINIC | Age: 45
End: 2020-11-06

## 2020-11-06 DIAGNOSIS — R05.9 COUGH: ICD-10-CM

## 2020-11-06 DIAGNOSIS — U07.1 COVID-19 VIRUS DETECTED: Primary | ICD-10-CM

## 2020-11-06 PROCEDURE — 99212 OFFICE O/P EST SF 10 MIN: CPT | Performed by: FAMILY MEDICINE

## 2020-11-06 RX ORDER — PROMETHAZINE HYDROCHLORIDE AND PHENYLEPHRINE HYDROCHLORIDE 6.25; 5 MG/5ML; MG/5ML
5 SYRUP ORAL EVERY 8 HOURS PRN
Qty: 118 ML | Refills: 0 | Status: SHIPPED | OUTPATIENT
Start: 2020-11-06 | End: 2021-07-08

## 2020-11-06 NOTE — PROGRESS NOTES
Subjective   Deepak Albarran is a 45 y.o. male.   Telemedicine visit/telephone encounter.    History of Present Illness   Pt testing positive for COVID x2 in past 24 hour. Needs something for cough.  Has ST,cough, congestion. No fever,dyspnea, loss of taste or smell.    The following portions of the patient's history were reviewed and updated as appropriate: allergies, current medications, past family history, past medical history, past social history, past surgical history and problem list.    Review of Systems   Constitutional: Negative.  Negative for activity change, fatigue, fever, unexpected weight gain and unexpected weight loss.   HENT: Positive for congestion, postnasal drip, rhinorrhea and sore throat. Negative for sneezing.    Eyes: Negative.  Negative for blurred vision, double vision and visual disturbance.   Respiratory: Positive for cough. Negative for chest tightness, shortness of breath and wheezing.    Cardiovascular: Negative.  Negative for chest pain, palpitations and leg swelling.   Gastrointestinal: Negative.  Negative for abdominal distention, abdominal pain, blood in stool, constipation, diarrhea and nausea.   Endocrine: Negative.  Negative for cold intolerance and heat intolerance.   Genitourinary: Negative.  Negative for dysuria, frequency, urgency and urinary incontinence.   Musculoskeletal: Negative.  Negative for arthralgias and myalgias.   Skin: Negative.  Negative for rash.   Allergic/Immunologic: Negative.    Neurological: Negative.  Negative for dizziness, syncope, numbness and memory problem.   Hematological: Negative.  Negative for adenopathy.   Psychiatric/Behavioral: Negative.  Negative for suicidal ideas and depressed mood. The patient is not nervous/anxious.    All other systems reviewed and are negative.      Objective   Physical Exam  PE not obtained    Assessment/Plan   Diagnoses and all orders for this visit:    1. COVID-19 virus detected (Primary)    2. Cough    Phenergan  with codeine 1 tsp q 8 hr prn  Increase fluids. Zinc, Vit C  RTC/UTC/ER if worsening

## 2020-11-06 NOTE — TELEPHONE ENCOUNTER
PATIENT IS CALLING TO REQUEST SOME COUGH MEDICINE   HE TESTED POSITIVE FOR COVID 19 TODAY   HE IS NOT SLEEPING BECAUSE OF THE COUGH     PLEASE CALL BACK [phone]   AYAD BYRNES

## 2020-11-06 NOTE — TELEPHONE ENCOUNTER
Patient wanted to let Dr. Child know that he tested positive for COVID this morning through a rapid test.  He can be reached at [phone]

## 2020-11-07 ENCOUNTER — TELEPHONE (OUTPATIENT)
Dept: FAMILY MEDICINE CLINIC | Facility: CLINIC | Age: 45
End: 2020-11-07

## 2020-11-07 DIAGNOSIS — R05.9 COUGH: Primary | ICD-10-CM

## 2020-11-07 RX ORDER — GUAIFENESIN AND CODEINE PHOSPHATE 100; 10 MG/5ML; MG/5ML
5 SOLUTION ORAL 3 TIMES DAILY PRN
Qty: 118 ML | Refills: 0 | Status: SHIPPED | OUTPATIENT
Start: 2020-11-07 | End: 2021-07-08

## 2020-11-07 NOTE — TELEPHONE ENCOUNTER
----- Message from Zarina Baldwin DO sent at 11/7/2020 10:44 AM EST -----  Regarding: FW: COUGH MEDICINE  I sent him a cough syrup with codeine  ----- Message -----  From: Fela Lewis RegSched Rep  Sent: 11/7/2020   9:36 AM EST  To: Zarina Baldwin DO  Subject: COUGH MEDICINE                                   PT STATES THAT NONE OF THE PHARMACY CARRY THE COUGH MEDICINE HE WAS PRESCRIBED CAN HE BE GIVEN ANOTHER SCRIPT

## 2020-11-09 NOTE — TELEPHONE ENCOUNTER
It looks like Dr. Baldwin called him in some cough syrup on Friday.  Please make sure he was able to pick this medication up.  If he needs anything else please let me know.

## 2021-07-08 ENCOUNTER — OFFICE VISIT (OUTPATIENT)
Dept: FAMILY MEDICINE CLINIC | Facility: CLINIC | Age: 46
End: 2021-07-08

## 2021-07-08 VITALS
HEART RATE: 84 BPM | OXYGEN SATURATION: 100 % | HEIGHT: 73 IN | DIASTOLIC BLOOD PRESSURE: 72 MMHG | SYSTOLIC BLOOD PRESSURE: 130 MMHG | WEIGHT: 217.6 LBS | TEMPERATURE: 97.6 F | BODY MASS INDEX: 28.84 KG/M2 | RESPIRATION RATE: 18 BRPM

## 2021-07-08 DIAGNOSIS — Z00.00 WELL ADULT EXAM: Primary | ICD-10-CM

## 2021-07-08 DIAGNOSIS — Z12.11 COLON CANCER SCREENING: ICD-10-CM

## 2021-07-08 DIAGNOSIS — R00.2 PALPITATIONS: ICD-10-CM

## 2021-07-08 DIAGNOSIS — M1A.0790 CHRONIC IDIOPATHIC GOUT INVOLVING TOE WITHOUT TOPHUS, UNSPECIFIED LATERALITY: ICD-10-CM

## 2021-07-08 DIAGNOSIS — M1A.9XX0 CHRONIC GOUT INVOLVING TOE OF LEFT FOOT WITHOUT TOPHUS, UNSPECIFIED CAUSE: ICD-10-CM

## 2021-07-08 DIAGNOSIS — I10 ESSENTIAL HYPERTENSION: ICD-10-CM

## 2021-07-08 LAB
ALBUMIN SERPL-MCNC: 4.8 G/DL (ref 3.5–5.2)
ALBUMIN/GLOB SERPL: 2.1 G/DL
ALP SERPL-CCNC: 86 U/L (ref 39–117)
ALT SERPL W P-5'-P-CCNC: 62 U/L (ref 1–41)
ANION GAP SERPL CALCULATED.3IONS-SCNC: 9.2 MMOL/L (ref 5–15)
AST SERPL-CCNC: 40 U/L (ref 1–40)
BASOPHILS # BLD AUTO: 0.03 10*3/MM3 (ref 0–0.2)
BASOPHILS NFR BLD AUTO: 0.9 % (ref 0–1.5)
BILIRUB SERPL-MCNC: 0.8 MG/DL (ref 0–1.2)
BUN SERPL-MCNC: 11 MG/DL (ref 6–20)
BUN/CREAT SERPL: 12.2 (ref 7–25)
CALCIUM SPEC-SCNC: 9.8 MG/DL (ref 8.6–10.5)
CHLORIDE SERPL-SCNC: 104 MMOL/L (ref 98–107)
CHOLEST SERPL-MCNC: 196 MG/DL (ref 0–200)
CO2 SERPL-SCNC: 25.8 MMOL/L (ref 22–29)
CREAT SERPL-MCNC: 0.9 MG/DL (ref 0.76–1.27)
DEPRECATED RDW RBC AUTO: 49.3 FL (ref 37–54)
EOSINOPHIL # BLD AUTO: 0.06 10*3/MM3 (ref 0–0.4)
EOSINOPHIL NFR BLD AUTO: 1.8 % (ref 0.3–6.2)
ERYTHROCYTE [DISTWIDTH] IN BLOOD BY AUTOMATED COUNT: 13.4 % (ref 12.3–15.4)
GFR SERPL CREATININE-BSD FRML MDRD: 90 ML/MIN/1.73
GLOBULIN UR ELPH-MCNC: 2.3 GM/DL
GLUCOSE SERPL-MCNC: 92 MG/DL (ref 65–99)
HBA1C MFR BLD: 4.72 % (ref 4.8–5.6)
HCT VFR BLD AUTO: 52.1 % (ref 37.5–51)
HDLC SERPL-MCNC: 37 MG/DL (ref 40–60)
HGB BLD-MCNC: 18.5 G/DL (ref 13–17.7)
IMM GRANULOCYTES # BLD AUTO: 0 10*3/MM3 (ref 0–0.05)
IMM GRANULOCYTES NFR BLD AUTO: 0 % (ref 0–0.5)
LDLC SERPL CALC-MCNC: 124 MG/DL (ref 0–100)
LDLC/HDLC SERPL: 3.24 {RATIO}
LYMPHOCYTES # BLD AUTO: 1.4 10*3/MM3 (ref 0.7–3.1)
LYMPHOCYTES NFR BLD AUTO: 41.1 % (ref 19.6–45.3)
MCH RBC QN AUTO: 35.2 PG (ref 26.6–33)
MCHC RBC AUTO-ENTMCNC: 35.5 G/DL (ref 31.5–35.7)
MCV RBC AUTO: 99 FL (ref 79–97)
MONOCYTES # BLD AUTO: 0.37 10*3/MM3 (ref 0.1–0.9)
MONOCYTES NFR BLD AUTO: 10.9 % (ref 5–12)
NEUTROPHILS NFR BLD AUTO: 1.55 10*3/MM3 (ref 1.7–7)
NEUTROPHILS NFR BLD AUTO: 45.3 % (ref 42.7–76)
NRBC BLD AUTO-RTO: 0 /100 WBC (ref 0–0.2)
PLATELET # BLD AUTO: 168 10*3/MM3 (ref 140–450)
PMV BLD AUTO: 10.5 FL (ref 6–12)
POTASSIUM SERPL-SCNC: 5.9 MMOL/L (ref 3.5–5.2)
PROT SERPL-MCNC: 7.1 G/DL (ref 6–8.5)
RBC # BLD AUTO: 5.26 10*6/MM3 (ref 4.14–5.8)
SODIUM SERPL-SCNC: 139 MMOL/L (ref 136–145)
TRIGL SERPL-MCNC: 196 MG/DL (ref 0–150)
URATE SERPL-MCNC: 4.7 MG/DL (ref 3.4–7)
VLDLC SERPL-MCNC: 35 MG/DL (ref 5–40)
WBC # BLD AUTO: 3.41 10*3/MM3 (ref 3.4–10.8)

## 2021-07-08 PROCEDURE — 99213 OFFICE O/P EST LOW 20 MIN: CPT | Performed by: FAMILY MEDICINE

## 2021-07-08 PROCEDURE — 99396 PREV VISIT EST AGE 40-64: CPT | Performed by: FAMILY MEDICINE

## 2021-07-08 PROCEDURE — 80053 COMPREHEN METABOLIC PANEL: CPT | Performed by: FAMILY MEDICINE

## 2021-07-08 PROCEDURE — 85025 COMPLETE CBC W/AUTO DIFF WBC: CPT | Performed by: FAMILY MEDICINE

## 2021-07-08 PROCEDURE — 80061 LIPID PANEL: CPT | Performed by: FAMILY MEDICINE

## 2021-07-08 PROCEDURE — 83036 HEMOGLOBIN GLYCOSYLATED A1C: CPT | Performed by: FAMILY MEDICINE

## 2021-07-08 PROCEDURE — 84550 ASSAY OF BLOOD/URIC ACID: CPT | Performed by: FAMILY MEDICINE

## 2021-07-08 RX ORDER — ALLOPURINOL 300 MG/1
300 TABLET ORAL DAILY
Qty: 90 TABLET | Refills: 3 | Status: SHIPPED | OUTPATIENT
Start: 2021-07-08 | End: 2022-08-11 | Stop reason: SDUPTHER

## 2021-07-08 RX ORDER — LOSARTAN POTASSIUM 100 MG/1
100 TABLET ORAL DAILY
Qty: 90 TABLET | Refills: 3 | Status: SHIPPED | OUTPATIENT
Start: 2021-07-08 | End: 2022-08-11 | Stop reason: SDUPTHER

## 2021-07-08 NOTE — PROGRESS NOTES
Deepak Albarran is a 46 y.o. male who presents today to complete annual exam.    Chief Complaint   Patient presents with   • Annual Exam        Patient is here for annual exam. He continues to have occasional palpitations and chest aching. He does not notice this with activity. He is interested in cardiac eval. He has started walking/running for exercise 45-60 min  3-4 days a week. His diet it carb heavy. He is sexually active with 5 female partners in the last year with intermittent condom use. He sees dentist regularly. He has not seen optometry in several years. He does not see derm.        Review of Systems   Constitutional: Negative for fever and unexpected weight loss.   HENT: Negative for congestion, ear pain and sore throat.    Eyes: Negative for visual disturbance.   Respiratory: Negative for cough, shortness of breath and wheezing.    Cardiovascular: Positive for palpitations. Negative for chest pain.   Gastrointestinal: Negative for abdominal pain, blood in stool, constipation, diarrhea, nausea, vomiting and GERD.   Endocrine: Negative for polydipsia and polyuria.   Genitourinary: Negative for difficulty urinating.   Musculoskeletal: Negative for joint swelling.   Skin: Negative for rash and skin lesions.   Allergic/Immunologic: Negative for environmental allergies.   Neurological: Negative for seizures and syncope.   Hematological: Does not bruise/bleed easily.   Psychiatric/Behavioral: Negative for suicidal ideas.        PHQ-9 Depression Screening  Little interest or pleasure in doing things? 0   Feeling down, depressed, or hopeless? 0   Trouble falling or staying asleep, or sleeping too much?     Feeling tired or having little energy?     Poor appetite or overeating?     Feeling bad about yourself - or that you are a failure or have let yourself or your family down?     Trouble concentrating on things, such as reading the newspaper or watching television?     Moving or speaking so slowly that other  people could have noticed? Or the opposite - being so fidgety or restless that you have been moving around a lot more than usual?     Thoughts that you would be better off dead, or of hurting yourself in some way?     PHQ-9 Total Score 0   If you checked off any problems, how difficult have these problems made it for you to do your work, take care of things at home, or get along with other people?         Past Medical History:   Diagnosis Date   • Gout    • HTN (hypertension)         Past Surgical History:   Procedure Laterality Date   • LASIK Bilateral    • LIPOMA RESECTION     • WISDOM TOOTH EXTRACTION          Family History   Problem Relation Age of Onset   • No Known Problems Mother    • Osteoarthritis Father    • Hearing loss Father    • No Known Problems Sister    • Epilepsy Son    • Leukemia Maternal Grandmother    • Other Maternal Grandfather         unknown   • Lung cancer Paternal Grandmother    • Other Paternal Grandfather         unknown        Social History     Socioeconomic History   • Marital status: Single     Spouse name: N/A   • Number of children: 1   • Years of education: College   • Highest education level: Bachelor's degree (e.g., BA, AB, BS)   Tobacco Use   • Smoking status: Former Smoker     Packs/day: 1.00     Years: 10.00     Pack years: 10.00     Types: Cigarettes     Quit date: 2003     Years since quittin.0   • Smokeless tobacco: Current User     Types: Snuff   Substance and Sexual Activity   • Alcohol use: Yes     Alcohol/week: 24.0 standard drinks     Types: 12 Glasses of wine, 12 Shots of liquor per week   • Drug use: No   • Sexual activity: Yes     Partners: Female     Birth control/protection: Post-menopausal, Condom     Comment: 5 female partners in the last year, intermittant condom use.        No current outpatient medications on file prior to visit.     No current facility-administered medications on file prior to visit.       No Known Allergies     Visit  "Vitals  /72   Pulse 84   Temp 97.6 °F (36.4 °C) (Infrared)   Resp 18   Ht 185.4 cm (73\")   Wt 98.7 kg (217 lb 9.6 oz)   SpO2 100%   BMI 28.71 kg/m²      Body mass index is 28.71 kg/m².    Physical Exam  Constitutional:       General: He is not in acute distress.     Appearance: He is well-developed. He is not diaphoretic.   HENT:      Head: Atraumatic.   Cardiovascular:      Rate and Rhythm: Normal rate and regular rhythm.      Heart sounds: Normal heart sounds. No murmur heard.   No friction rub. No gallop.    Pulmonary:      Effort: Pulmonary effort is normal. No respiratory distress.      Breath sounds: Normal breath sounds. No wheezing or rales.   Abdominal:      General: Bowel sounds are normal. There is no distension.      Palpations: Abdomen is soft.      Tenderness: There is no abdominal tenderness.   Musculoskeletal:      Cervical back: Normal range of motion and neck supple.   Skin:     General: Skin is warm and dry.   Neurological:      Mental Status: He is alert and oriented to person, place, and time.   Psychiatric:         Behavior: Behavior normal.               Problems Addressed this Visit        Cardiac and Vasculature    HTN (hypertension)    Relevant Medications    losartan (COZAAR) 100 MG tablet    Palpitations     Chronic and stable.  Patient was given referral to cardiology for further evaluation and treatment.  RTC/ED precautions given.         Relevant Orders    Ambulatory Referral to Cardiology       Gastrointestinal Abdominal     Colon cancer screening    Relevant Orders    Cologuard - Stool, Per Rectum       Health Encounters    Well adult exam - Primary     The patient is here for health maintenance visit.  Currently, the patient consumes a healthy diet and has an adequate exercise regimen.  Screening lab work is ordered.  Immunizations were reviewed today.  Advice and education was given regarding nutrition, aerobic exercise, routine dental evaluations, routine eye exams, " reproductive health, cardiovascular risk reduction, sunscreen use, self skin examination (annual dermatology evaluations) and seatbelt use (general overall safety).  Further recommendations will be given if needed after lab evaluation.  Annual wellness evaluation is recommended.           Relevant Orders    CBC & Differential (Completed)    Comprehensive Metabolic Panel (Completed)    Hemoglobin A1c (Completed)    Lipid Panel (Completed)    Uric Acid (Completed)       Musculoskeletal and Injuries    Gout    Relevant Medications    allopurinol (ZYLOPRIM) 300 MG tablet    Other Relevant Orders    Uric Acid (Completed)      Diagnoses       Codes Comments    Well adult exam    -  Primary ICD-10-CM: Z00.00  ICD-9-CM: V70.0     Chronic gout involving toe of left foot without tophus, unspecified cause     ICD-10-CM: M1A.9XX0  ICD-9-CM: 274.02     Colon cancer screening     ICD-10-CM: Z12.11  ICD-9-CM: V76.51     Palpitations     ICD-10-CM: R00.2  ICD-9-CM: 785.1     Chronic idiopathic gout involving toe without tophus, unspecified laterality     ICD-10-CM: M1A.0790  ICD-9-CM: 274.02     Essential hypertension     ICD-10-CM: I10  ICD-9-CM: 401.9           Return in about 1 year (around 7/8/2022) for Annual.    Parts of this office note have been dictated by voice recognition software. Grammatical and/or spelling errors may be present.     Nii Child MD  7/11/2021

## 2021-07-11 PROBLEM — R00.2 PALPITATIONS: Status: ACTIVE | Noted: 2021-07-11

## 2021-07-11 PROBLEM — Z12.11 COLON CANCER SCREENING: Status: ACTIVE | Noted: 2021-07-11

## 2021-07-11 NOTE — ASSESSMENT & PLAN NOTE
Chronic and stable.  Patient was given referral to cardiology for further evaluation and treatment.  RTC/ED precautions given.

## 2021-07-15 DIAGNOSIS — E87.5 HYPERKALEMIA: Primary | ICD-10-CM

## 2021-11-08 ENCOUNTER — OFFICE VISIT (OUTPATIENT)
Dept: FAMILY MEDICINE CLINIC | Facility: CLINIC | Age: 46
End: 2021-11-08

## 2021-11-08 VITALS
RESPIRATION RATE: 20 BRPM | OXYGEN SATURATION: 98 % | WEIGHT: 218 LBS | DIASTOLIC BLOOD PRESSURE: 84 MMHG | TEMPERATURE: 97 F | BODY MASS INDEX: 28.89 KG/M2 | HEART RATE: 92 BPM | SYSTOLIC BLOOD PRESSURE: 122 MMHG | HEIGHT: 73 IN

## 2021-11-08 DIAGNOSIS — J40 BRONCHITIS: ICD-10-CM

## 2021-11-08 DIAGNOSIS — J01.40 ACUTE NON-RECURRENT PANSINUSITIS: Primary | ICD-10-CM

## 2021-11-08 PROCEDURE — 99213 OFFICE O/P EST LOW 20 MIN: CPT | Performed by: NURSE PRACTITIONER

## 2021-11-08 RX ORDER — DEXTROMETHORPHAN HYDROBROMIDE AND PROMETHAZINE HYDROCHLORIDE 15; 6.25 MG/5ML; MG/5ML
5 SYRUP ORAL NIGHTLY PRN
Qty: 118 ML | Refills: 0 | Status: SHIPPED | OUTPATIENT
Start: 2021-11-08 | End: 2022-04-28

## 2021-11-08 RX ORDER — METHYLPREDNISOLONE 4 MG/1
TABLET ORAL
Qty: 21 EACH | Refills: 0 | Status: SHIPPED | OUTPATIENT
Start: 2021-11-08 | End: 2022-04-28

## 2021-11-08 RX ORDER — AMOXICILLIN AND CLAVULANATE POTASSIUM 875; 125 MG/1; MG/1
1 TABLET, FILM COATED ORAL 2 TIMES DAILY
Qty: 20 TABLET | Refills: 0 | Status: SHIPPED | OUTPATIENT
Start: 2021-11-08 | End: 2021-11-18

## 2021-11-08 RX ORDER — ALBUTEROL SULFATE 90 UG/1
2 AEROSOL, METERED RESPIRATORY (INHALATION) EVERY 4 HOURS PRN
Qty: 18 G | Refills: 0 | Status: SHIPPED | OUTPATIENT
Start: 2021-11-08 | End: 2022-04-28 | Stop reason: SDUPTHER

## 2021-11-10 NOTE — PROGRESS NOTES
Follow Up Office Note     Patient Name: Deepak Albarran  : 1975   MRN: [id number]     Chief Complaint:    Chief Complaint   Patient presents with   • URI       History of Present Illness: Deepak Albarran is a 46 y.o. male who presents today with c/o sinus congestion, post-nasal drainage and cough x several days. He denies fever, chills, body aches, loss of taste or smell. Patient has received both Pfizer Covid-19 vaccinations. Patient tested positive for Covid-19 2020.  Patient has allergies and reports that he usually gets bronchitis a couple times/year.      Subjective      Review of Systems:   Review of Systems   Constitutional: Negative for activity change, appetite change, chills, diaphoresis, fatigue and fever.   HENT: Positive for congestion, postnasal drip, rhinorrhea, sinus pressure and sinus pain. Negative for ear discharge, ear pain, facial swelling, sore throat, trouble swallowing and voice change.    Respiratory: Positive for cough (mostly non-productive but occasionally coughs up yellow mucous. Cough worse at night.), chest tightness and wheezing (occasional ). Negative for shortness of breath and stridor.    Cardiovascular: Negative.    Gastrointestinal: Negative.    Musculoskeletal: Negative for arthralgias and myalgias.   Skin: Negative for rash.   Allergic/Immunologic: Positive for environmental allergies.   Neurological: Negative.         Past Medical History:   Past Medical History:   Diagnosis Date   • Gout    • HTN (hypertension)          Medications:     Current Outpatient Medications:   •  allopurinol (ZYLOPRIM) 300 MG tablet, Take 1 tablet by mouth Daily., Disp: 90 tablet, Rfl: 3  •  losartan (COZAAR) 100 MG tablet, Take 1 tablet by mouth Daily., Disp: 90 tablet, Rfl: 3  •  albuterol sulfate  (90 Base) MCG/ACT inhaler, Inhale 2 puffs Every 4 (Four) Hours As Needed for Wheezing or Shortness of Air., Disp: 18 g, Rfl: 0  •  amoxicillin-clavulanate (AUGMENTIN) 875-125  "MG per tablet, Take 1 tablet by mouth 2 (Two) Times a Day for 10 days., Disp: 20 tablet, Rfl: 0  •  methylPREDNISolone (MEDROL) 4 MG dose pack, Take as directed on package instructions., Disp: 21 each, Rfl: 0  •  promethazine-dextromethorphan (PROMETHAZINE-DM) 6.25-15 MG/5ML syrup, Take 5 mL by mouth At Night As Needed for Cough., Disp: 118 mL, Rfl: 0    Allergies:   No Known Allergies      Objective     Physical Exam:  Vital Signs:   Vitals:    11/08/21 1531   BP: 122/84   Pulse: 92   Resp: 20   Temp: 97 °F (36.1 °C)   SpO2: 98%   Weight: 98.9 kg (218 lb)   Height: 185.4 cm (73\")     Body mass index is 28.76 kg/m².     Physical Exam  Vitals and nursing note reviewed.   Constitutional:       General: He is not in acute distress.     Appearance: Normal appearance. He is well-developed. He is not ill-appearing, toxic-appearing or diaphoretic.   HENT:      Head: Normocephalic and atraumatic.      Right Ear: External ear normal. A middle ear effusion is present. Tympanic membrane is bulging.      Left Ear: External ear normal. A middle ear effusion is present. Tympanic membrane is bulging.      Nose: Mucosal edema and congestion present.      Right Turbinates: Swollen.      Left Turbinates: Swollen.      Right Sinus: Maxillary sinus tenderness and frontal sinus tenderness present.      Left Sinus: Maxillary sinus tenderness and frontal sinus tenderness present.      Mouth/Throat:      Lips: Pink.      Mouth: Mucous membranes are moist.      Pharynx: Uvula midline. Posterior oropharyngeal erythema (moderate with cobblestoning) present.   Cardiovascular:      Rate and Rhythm: Normal rate and regular rhythm.      Heart sounds: Normal heart sounds.   Pulmonary:      Effort: Pulmonary effort is normal. No tachypnea or respiratory distress.      Breath sounds: Normal breath sounds. No stridor. No decreased breath sounds, wheezing, rhonchi or rales.   Musculoskeletal:      Cervical back: Normal range of motion and neck supple. " No rigidity. No muscular tenderness.   Lymphadenopathy:      Cervical: No cervical adenopathy.   Skin:     General: Skin is warm and dry.   Neurological:      Mental Status: He is alert and oriented to person, place, and time.   Psychiatric:         Mood and Affect: Mood normal.         Behavior: Behavior normal. Behavior is cooperative.         Thought Content: Thought content normal.         Judgment: Judgment normal.         Assessment / Plan      Assessment/Plan:   Diagnoses and all orders for this visit:    1. Acute non-recurrent pansinusitis (Primary)  -     albuterol sulfate  (90 Base) MCG/ACT inhaler; Inhale 2 puffs Every 4 (Four) Hours As Needed for Wheezing or Shortness of Air.  Dispense: 18 g; Refill: 0  -     amoxicillin-clavulanate (AUGMENTIN) 875-125 MG per tablet; Take 1 tablet by mouth 2 (Two) Times a Day for 10 days.  Dispense: 20 tablet; Refill: 0    2. Bronchitis  -     promethazine-dextromethorphan (PROMETHAZINE-DM) 6.25-15 MG/5ML syrup; Take 5 mL by mouth At Night As Needed for Cough.  Dispense: 118 mL; Refill: 0  -     methylPREDNISolone (MEDROL) 4 MG dose pack; Take as directed on package instructions.  Dispense: 21 each; Refill: 0       Follow Up:   PRN and at next scheduled appointment(s) with PCP.    Discussed the nature of the medical condition(s) risks, complications, implications, management, safe and proper use of medications. Encouraged medication compliance, and keeping scheduled follow up appointments with me and any other providers.      RTC if symptoms fail to improve, to ER if symptoms worsen.      IRIS Mulligan  [facility] [place]

## 2021-11-10 NOTE — PATIENT INSTRUCTIONS
Sinusitis, Adult  Sinusitis is inflammation of your sinuses. Sinuses are hollow spaces in the bones around your face. Your sinuses are located:  · Around your eyes.  · In the middle of your forehead.  · Behind your nose.  · In your cheekbones.  Mucus normally drains out of your sinuses. When your nasal tissues become inflamed or swollen, mucus can become trapped or blocked. This allows bacteria, viruses, and fungi to grow, which leads to infection. Most infections of the sinuses are caused by a virus.  Sinusitis can develop quickly. It can last for up to 4 weeks (acute) or for more than 12 weeks (chronic). Sinusitis often develops after a cold.  What are the causes?  This condition is caused by anything that creates swelling in the sinuses or stops mucus from draining. This includes:  · Allergies.  · Asthma.  · Infection from bacteria or viruses.  · Deformities or blockages in your nose or sinuses.  · Abnormal growths in the nose (nasal polyps).  · Pollutants, such as chemicals or irritants in the air.  · Infection from fungi (rare).  What increases the risk?  You are more likely to develop this condition if you:  · Have a weak body defense system (immune system).  · Do a lot of swimming or diving.  · Overuse nasal sprays.  · Smoke.  What are the signs or symptoms?  The main symptoms of this condition are pain and a feeling of pressure around the affected sinuses. Other symptoms include:  · Stuffy nose or congestion.  · Thick drainage from your nose.  · Swelling and warmth over the affected sinuses.  · Headache.  · Upper toothache.  · A cough that may get worse at night.  · Extra mucus that collects in the throat or the back of the nose (postnasal drip).  · Decreased sense of smell and taste.  · Fatigue.  · A fever.  · Sore throat.  · Bad breath.  How is this diagnosed?  This condition is diagnosed based on:  · Your symptoms.  · Your medical history.  · A physical exam.  · Tests to find out if your condition is  acute or chronic. This may include:  ? Checking your nose for nasal polyps.  ? Viewing your sinuses using a device that has a light (endoscope).  ? Testing for allergies or bacteria.  ? Imaging tests, such as an MRI or CT scan.  In rare cases, a bone biopsy may be done to rule out more serious types of fungal sinus disease.  How is this treated?  Treatment for sinusitis depends on the cause and whether your condition is chronic or acute.  · If caused by a virus, your symptoms should go away on their own within 10 days. You may be given medicines to relieve symptoms. They include:  ? Medicines that shrink swollen nasal passages (topical intranasal decongestants).  ? Medicines that treat allergies (antihistamines).  ? A spray that eases inflammation of the nostrils (topical intranasal corticosteroids).  ? Rinses that help get rid of thick mucus in your nose (nasal saline washes).  · If caused by bacteria, your health care provider may recommend waiting to see if your symptoms improve. Most bacterial infections will get better without antibiotic medicine. You may be given antibiotics if you have:  ? A severe infection.  ? A weak immune system.  · If caused by narrow nasal passages or nasal polyps, you may need to have surgery.  Follow these instructions at home:  Medicines  · Take, use, or apply over-the-counter and prescription medicines only as told by your health care provider. These may include nasal sprays.  · If you were prescribed an antibiotic medicine, take it as told by your health care provider. Do not stop taking the antibiotic even if you start to feel better.  Hydrate and humidify    · Drink enough fluid to keep your urine pale yellow. Staying hydrated will help to thin your mucus.  · Use a cool mist humidifier to keep the humidity level in your home above 50%.  · Inhale steam for 10-15 minutes, 3-4 times a day, or as told by your health care provider. You can do this in the bathroom while a hot shower is  running.  · Limit your exposure to cool or dry air.    Rest  · Rest as much as possible.  · Sleep with your head raised (elevated).  · Make sure you get enough sleep each night.  General instructions    · Apply a warm, moist washcloth to your face 3-4 times a day or as told by your health care provider. This will help with discomfort.  · Wash your hands often with soap and water to reduce your exposure to germs. If soap and water are not available, use hand .  · Do not smoke. Avoid being around people who are smoking (secondhand smoke).  · Keep all follow-up visits as told by your health care provider. This is important.    Contact a health care provider if:  · You have a fever.  · Your symptoms get worse.  · Your symptoms do not improve within 10 days.  Get help right away if:  · You have a severe headache.  · You have persistent vomiting.  · You have severe pain or swelling around your face or eyes.  · You have vision problems.  · You develop confusion.  · Your neck is stiff.  · You have trouble breathing.  Summary  · Sinusitis is soreness and inflammation of your sinuses. Sinuses are hollow spaces in the bones around your face.  · This condition is caused by nasal tissues that become inflamed or swollen. The swelling traps or blocks the flow of mucus. This allows bacteria, viruses, and fungi to grow, which leads to infection.  · If you were prescribed an antibiotic medicine, take it as told by your health care provider. Do not stop taking the antibiotic even if you start to feel better.  · Keep all follow-up visits as told by your health care provider. This is important.  This information is not intended to replace advice given to you by your health care provider. Make sure you discuss any questions you have with your health care provider.  Document Revised: 05/20/2019 Document Reviewed: 05/20/2019  [employer] Patient Education © 2021 [employer]    Acute Bronchitis, Adult    Acute bronchitis is sudden  or acute swelling of the air tubes (bronchi) in the lungs. Acute bronchitis causes these tubes to fill with mucus, which can make it hard to breathe. It can also cause coughing or wheezing.  In adults, acute bronchitis usually goes away within 2 weeks. A cough caused by bronchitis may last up to 3 weeks. Smoking, allergies, and asthma can make the condition worse.  What are the causes?  This condition can be caused by germs and by substances that irritate the lungs, including:  · Cold and flu viruses. The most common cause of this condition is the virus that causes the common cold.  · Bacteria.  · Substances that irritate the lungs, including:  ? Smoke from cigarettes and other forms of tobacco.  ? Dust and pollen.  ? Fumes from chemical products, gases, or burned fuel.  ? Other materials that pollute indoor or outdoor air.  · Close contact with someone who has acute bronchitis.  What increases the risk?  The following factors may make you more likely to develop this condition:  · A weak body's defense system, also called the immune system.  · A condition that affects your lungs and breathing, such as asthma.  What are the signs or symptoms?  Common symptoms of this condition include:  · Lung and breathing problems, such as:  ? Coughing. This may bring up clear, yellow, or green mucus from your lungs (sputum).  ? Wheezing.  ? Having too much mucus in your lungs (chest congestion).  ? Having shortness of breath.  · A fever.  · Chills.  · Aches and pains, including:  ? Tightness in your chest and other body aches.  ? A sore throat.  How is this diagnosed?  This condition is usually diagnosed based on:  · Your symptoms and medical history.  · A physical exam.  You may also have other tests, including tests to rule out other conditions, such pneumonia. These tests include:  · A test of lung function.  · Test of a mucus sample to look for the presence of bacteria.  · Tests to check the oxygen level in your  blood.  · Blood tests.  · Chest X-ray.  How is this treated?  Most cases of acute bronchitis clear up over time without treatment. Your health care provider may recommend:  · Drinking more fluids. This can thin your mucus, which may improve your breathing.  · Taking a medicine for a fever or cough.  · Using a device that gets medicine into your lungs (inhaler) to help improve breathing and control coughing.  · Using a vaporizer or a humidifier. These are machines that add water to the air to help you breathe better.  Follow these instructions at home:  Activity  · Get plenty of rest.  · Return to your normal activities as told by your health care provider. Ask your health care provider what activities are safe for you.  Lifestyle  · Drink enough fluid to keep your urine pale yellow.  · Do not drink alcohol.  · Do not use any products that contain nicotine or tobacco, such as cigarettes, e-cigarettes, and chewing tobacco. If you need help quitting, ask your health care provider. Be aware that:  ? Your bronchitis will get worse if you smoke or breathe in other people's smoke (secondhand smoke).  ? Your lungs will heal faster if you quit smoking.  General instructions    · Take over-the-counter and prescription medicines only as told by your health care provider.  · Use an inhaler, vaporizer, or humidifier as told by your health care provider.  · If you have a sore throat, gargle with a salt-water mixture 3-4 times a day or as needed. To make a salt-water mixture, completely dissolve ½-1 tsp (3-6 g) of salt in 1 cup (237 mL) of warm water.  · Keep all follow-up visits as told by your health care provider. This is important.    How is this prevented?  To lower your risk of getting this condition again:  · Wash your hands often with soap and water. If soap and water are not available, use hand .  · Avoid contact with people who have cold symptoms.  · Try not to touch your mouth, nose, or eyes with your  hands.  · Avoid places where there are fumes from chemicals. Breathing these fumes will make your condition worse.  · Get the flu shot every year.  Contact a health care provider if:  · Your symptoms do not improve after 2 weeks of treatment.  · You vomit more than once or twice.  · You have symptoms of dehydration such as:  ? Dark urine.  ? Dry skin or eyes.  ? Increased thirst.  ? Headaches.  ? Confusion.  ? Muscle cramps.  Get help right away if you:  · Cough up blood.  · Feel pain in your chest.  · Have severe shortness of breath.  · Faint or keep feeling like you are going to faint.  · Have a severe headache.  · Have fever or chills that get worse.  These symptoms may represent a serious problem that is an emergency. Do not wait to see if the symptoms will go away. Get medical help right away. Call your local emergency services (911 in the [place] Do not drive yourself to the hospital.  Summary  · Acute bronchitis is sudden (acute) inflammation of the air tubes (bronchi) between the windpipe and the lungs. In adults, acute bronchitis usually goes away within 2 weeks, although coughing may last 3 weeks or longer  · Take over-the-counter and prescription medicines only as told by your health care provider.  · Drink enough fluid to keep your urine pale yellow.  · Contact a health care provider if your symptoms do not improve after 2 weeks of treatment.  · Get help right away if you cough up blood, faint, or have chest pain or shortness of breath.  This information is not intended to replace advice given to you by your health care provider. Make sure you discuss any questions you have with your health care provider.  Document Revised: 08/31/2020 Document Reviewed: 07/10/2020  [employer] Patient Education © 2021 [employer]  Albuterol inhalation aerosol  What is this medicine?  ALBUTEROL (al BYOO ter ole) is a bronchodilator. It treats bronchospasm. Bronchospasm is when you have trouble breathing and make loud or  whistling sounds when you breathe. This drug opens the airways in the lungs so it is easier to breathe.  This medicine may be used for other purposes; ask your health care provider or pharmacist if you have questions.  COMMON BRAND NAME(S): Proair HFA, Proventil, Proventil HFA, Respirol, Ventolin, Ventolin HFA  What should I tell my health care provider before I take this medicine?  They need to know if you have any of the following conditions:  · diabetes (high blood sugar)  · heart disease  · high blood pressure  · irregular heartbeat or rhythm  · pheochromocytoma  · seizures  · thyroid disease  · an unusual or allergic reaction to albuterol, levalbuterol, other medicines, foods, dyes, or preservatives  · pregnant or trying to get pregnant  · breast-feeding  How should I use this medicine?  This medicine is for inhalation through the mouth. Follow the directions on your prescription label. Take your medicine at regular intervals. Do not use more often than directed. Make sure that you are using your inhaler correctly. Ask your doctor or health care provider if you have any questions.  Talk to your pediatrician regarding the use of this medicine in children. While this drug may be prescribed for children as young as 4 years for selected conditions, precautions do apply.  Overdosage: If you think you have taken too much of this medicine contact a poison control center or emergency room at once.  NOTE: This medicine is only for you. Do not share this medicine with others.  What if I miss a dose?  If you miss a dose, use it as soon as you can. If it is almost time for your next dose, use only that dose. Do not use double or extra doses.  What may interact with this medicine?  · anti-infectives like chloroquine and pentamidine  · caffeine  · cisapride  · diuretics  · medicines for colds  · medicines for depression or for emotional or psychotic conditions  · medicines for weight loss including some herbal  products  · methadone  · some antibiotics like clarithromycin, erythromycin, levofloxacin, and linezolid  · some heart medicines  · steroid hormones like dexamethasone, cortisone, hydrocortisone  · theophylline  · thyroid hormones  This list may not describe all possible interactions. Give your health care provider a list of all the medicines, herbs, non-prescription drugs, or dietary supplements you use. Also tell them if you smoke, drink alcohol, or use illegal drugs. Some items may interact with your medicine.  What should I watch for while using this medicine?  Visit your health care provider for regular checks on your progress. Tell your health care provider if your symptoms do not start to get better or if they get worse.  If your symptoms get worse or if you are using this drug more than normal, call your health care provider right away.  Do not treat yourself for coughs, colds or allergies without asking your health care provider for advice. Some nonprescription medicines can affect this one.  You and your health care provider should develop an Asthma Action Plan that is just for you. Be sure to know what to do if you are in the yellow (asthma is getting worse) or red (medical alert) zones.  Your mouth may get dry. Chewing sugarless gum or sucking hard candy and drinking plenty of water may help. Contact your health care provider if the problem does not go away or is severe.  What side effects may I notice from receiving this medicine?  Side effects that you should report to your doctor or health care professional as soon as possible:  · allergic reactions (skin rash, itching or hives; swelling of the face, lips, or tongue)  · fever  · heartbeat rhythm changes (trouble breathing; chest pain; dizziness; fast, irregular heartbeat; feeling faint or lightheaded, falls)  · increase in blood pressure  · muscle cramps, pain  · muscle weakness  · pain, tingling, numbness in the hands or feet  · vomiting  Side  "effects that usually do not require medical attention (report to your doctor or health care professional if they continue or are bothersome):  · change in taste  · cough  · dry mouth  · headache  · nasal congestion (runny or stuffy nose)  · sore throat  · tremors  · trouble sleeping  · upset stomach  This list may not describe all possible side effects. Call your doctor for medical advice about side effects. You may report side effects to FDA at 6-673-RVR-7468.  Where should I keep my medicine?  Keep out of the reach of children.  Store Proventil HFA and ProAir HFA at room temperature between 15 and 25 degrees C (59 and 77 degrees F). Store Ventolin HFA at room temperature between 20 and 25 degrees C (68 and 77 degrees F); it may be stored between 15 and 30 degrees C (59 and 86 degrees F) on occasion. The contents are under pressure and may burst when exposed to heat or flame. Do not freeze. This medicine does not work as well if it is too cold. Throw away the inhaler when the dose counter displays \"0\" or after the expiration date on the package, whichever comes first. Ventolin HFA should be thrown away 12 months after removing it from the foil pouch.  NOTE: This sheet is a summary. It may not cover all possible information. If you have questions about this medicine, talk to your doctor, pharmacist, or health care provider.  © 2021 [employer] (2020-12-18 12:38:45)  Amoxicillin; Clavulanic Acid Tablets  What is this medicine?  AMOXICILLIN; CLAVULANIC ACID (a mox i MARK in; MARIANO rodrigez AS id) is a penicillin antibiotic. It treats some infections caused by bacteria. It will not work for colds, the flu, or other viruses.  This medicine may be used for other purposes; ask your health care provider or pharmacist if you have questions.  COMMON BRAND NAME(S): Augmentin  What should I tell my health care provider before I take this medicine?  They need to know if you have any of these conditions:  · bowel " disease, like colitis  · kidney disease  · liver disease  · mononucleosis  · an unusual or allergic reaction to amoxicillin, penicillin, cephalosporin, other antibiotics, clavulanic acid, other medicines, foods, dyes, or preservatives  · pregnant or trying to get pregnant  · breast-feeding  How should I use this medicine?  Take this drug by mouth. Take it as directed on the prescription label at the same time every day. Take it with food at the start of a meal or snack. Take all of this drug unless your health care provider tells you to stop it early. Keep taking it even if you think you are better.  Talk to your health care provider about the use of this drug in children. While it may be prescribed for selected conditions, precautions do apply.  Overdosage: If you think you have taken too much of this medicine contact a poison control center or emergency room at once.  NOTE: This medicine is only for you. Do not share this medicine with others.  What if I miss a dose?  If you miss a dose, take it as soon as you can. If it is almost time for your next dose, take only that dose. Do not take double or extra doses.  What may interact with this medicine?  · allopurinol  · anticoagulants  · birth control pills  · methotrexate  · probenecid  This list may not describe all possible interactions. Give your health care provider a list of all the medicines, herbs, non-prescription drugs, or dietary supplements you use. Also tell them if you smoke, drink alcohol, or use illegal drugs. Some items may interact with your medicine.  What should I watch for while using this medicine?  Tell your doctor or healthcare provider if your symptoms do not improve.  This medicine may cause serious skin reactions. They can happen weeks to months after starting the medicine. Contact your healthcare provider right away if you notice fevers or flu-like symptoms with a rash. The rash may be red or purple and then turn into blisters or peeling of  the skin. Or, you might notice a red rash with swelling of the face, lips or lymph nodes in your neck or under your arms.  Do not treat diarrhea with over the counter products. Contact your doctor if you have diarrhea that lasts more than 2 days or if it is severe and watery.  If you have diabetes, you may get a false-positive result for sugar in your urine. Check with your doctor or healthcare provider.  Birth control pills may not work properly while you are taking this medicine. Talk to your doctor about using an extra method of birth control.  What side effects may I notice from receiving this medicine?  Side effects that you should report to your doctor or health care professional as soon as possible:  · allergic reactions like skin rash, itching or hives, swelling of the face, lips, or tongue  · breathing problems  · dark urine  · fever or chills, sore throat  · redness, blistering, peeling, or loosening of the skin, including inside the mouth  · seizures  · trouble passing urine or change in the amount of urine  · unusual bleeding, bruising  · unusually weak or tired  · white patches or sores in the mouth or throat  Side effects that usually do not require medical attention (report to your doctor or health care professional if they continue or are bothersome):  · diarrhea  · dizziness  · headache  · nausea, vomiting  · stomach upset  · vaginal or anal irritation  This list may not describe all possible side effects. Call your doctor for medical advice about side effects. You may report side effects to FDA at 3-705-FDA-0117.  Where should I keep my medicine?  Keep out of the reach of children and pets.  Store at room temperature between 20 and 25 degrees C (68 and 77 degrees F). Throw away any unused drug after the expiration date.  NOTE: This sheet is a summary. It may not cover all possible information. If you have questions about this medicine, talk to your doctor, pharmacist, or health care provider.  ©  2021 [employer] (2020-07-20 11:55:53)  Methylprednisolone tablets  What is this medicine?  METHYLPREDNISOLONE (meth ill pred NISS oh lone) is a corticosteroid. It is commonly used to treat inflammation of the skin, joints, lungs, and other organs. Common conditions treated include asthma, allergies, and arthritis. It is also used for other conditions, such as blood disorders and diseases of the adrenal glands.  This medicine may be used for other purposes; ask your health care provider or pharmacist if you have questions.  COMMON BRAND NAME(S): Medrol, Medrol Dosepak  What should I tell my health care provider before I take this medicine?  They need to know if you have any of these conditions:  · Cushing's syndrome  · eye disease, vision problems  · diabetes  · glaucoma  · heart disease  · high blood pressure  · infection (especially a virus infection such as chickenpox, cold sores, or herpes)  · liver disease  · mental illness  · myasthenia gravis  · osteoporosis  · recently received or scheduled to receive a vaccine  · seizures  · stomach or intestine problems  · thyroid disease  · an unusual or allergic reaction to lactose, methylprednisolone, other medicines, foods, dyes, or preservatives  · pregnant or trying to get pregnant  · breast-feeding  How should I use this medicine?  Take this medicine by mouth with a glass of water. Follow the directions on the prescription label. Take this medicine with food. If you are taking this medicine once a day, take it in the morning. Do not take it more often than directed. Do not suddenly stop taking your medicine because you may develop a severe reaction. Your doctor will tell you how much medicine to take. If your doctor wants you to stop the medicine, the dose may be slowly lowered over time to avoid any side effects.  Talk to your pediatrician regarding the use of this medicine in children. Special care may be needed.  Overdosage: If you think you have taken  too much of this medicine contact a poison control center or emergency room at once.  NOTE: This medicine is only for you. Do not share this medicine with others.  What if I miss a dose?  If you miss a dose, take it as soon as you can. If it is almost time for your next dose, talk to your doctor or health care professional. You may need to miss a dose or take an extra dose. Do not take double or extra doses without advice.  What may interact with this medicine?  Do not take this medicine with any of the following medications:  · alefacept  · echinacea  · live virus vaccines  · metyrapone  · mifepristone  This medicine may also interact with the following medications:  · amphotericin B  · aspirin and aspirin-like medicines  · certain antibiotics like erythromycin, clarithromycin, troleandomycin  · certain medicines for diabetes  · certain medicines for fungal infections like ketoconazole  · certain medicines for seizures like carbamazepine, phenobarbital, phenytoin  · certain medicines that treat or prevent blood clots like warfarin  · cholestyramine  · cyclosporine  · digoxin  · diuretics  · female hormones, like estrogens and birth control pills  · isoniazid  · NSAIDs, medicines for pain inflammation, like ibuprofen or naproxen  · other medicines for myasthenia gravis  · rifampin  · vaccines  This list may not describe all possible interactions. Give your health care provider a list of all the medicines, herbs, non-prescription drugs, or dietary supplements you use. Also tell them if you smoke, drink alcohol, or use illegal drugs. Some items may interact with your medicine.  What should I watch for while using this medicine?  Tell your doctor or healthcare professional if your symptoms do not start to get better or if they get worse. Do not stop taking except on your doctor's advice. You may develop a severe reaction. Your doctor will tell you how much medicine to take.  This medicine may increase your risk of  getting an infection. Tell your doctor or health care professional if you are around anyone with measles or chickenpox, or if you develop sores or blisters that do not heal properly.  This medicine may increase blood sugar levels. Ask your healthcare provider if changes in diet or medicines are needed if you have diabetes.  Tell your doctor or health care professional right away if you have any change in your eyesight.  Using this medicine for a long time may increase your risk of low bone mass. Talk to your doctor about bone health.  What side effects may I notice from receiving this medicine?  Side effects that you should report to your doctor or health care professional as soon as possible:  · allergic reactions like skin rash, itching or hives, swelling of the face, lips, or tongue  · bloody or tarry stools  · hallucination, loss of contact with reality  · muscle cramps  · muscle pain  · palpitations  · signs and symptoms of high blood sugar such as being more thirsty or hungry or having to urinate more than normal. You may also feel very tired or have blurry vision.  · signs and symptoms of infection like fever or chills; cough; sore throat; pain or trouble passing urine  Side effects that usually do not require medical attention (report to your doctor or health care professional if they continue or are bothersome):  · changes in emotions or mood  · constipation  · diarrhea  · excessive hair growth on the face or body  · headache  · nausea, vomiting  · trouble sleeping  · weight gain  This list may not describe all possible side effects. Call your doctor for medical advice about side effects. You may report side effects to FDA at 9-088-WRB-9616.  Where should I keep my medicine?  Keep out of the reach of children.  Store at room temperature between 20 and 25 degrees C (68 and 77 degrees F). Throw away any unused medicine after the expiration date.  NOTE: This sheet is a summary. It may not cover all possible  information. If you have questions about this medicine, talk to your doctor, pharmacist, or health care provider.  © 2021 [employer] (2019-09-19 09:19:36)  Promethazine; Dextromethorphan Oral Solution  What is this medicine?  PROMETHAZINE; DEXTROMETHORPHAN (proe METH a zeen; dex troe meth OR fan) is a cough suppressant and an antihistamine. It is used to treat coughing due to colds or allergies. This medicine will not treat an infection.  This medicine may be used for other purposes; ask your health care provider or pharmacist if you have questions.  COMMON BRAND NAME(S): Phen Tuss DM  What should I tell my health care provider before I take this medicine?  They need to know if you have any of the following conditions:  · blockage in your bowel  · diabetes  · eczema  · glaucoma  · heart disease  · liver disease  · low blood counts, like low white cell, platelet, or red cell counts  · lung or breathing disease, like asthma  · Parkinson's disease  · prostate disease  · seizures  · stomach or intestine problems  · trouble passing urine  · an unusual or allergic reaction to promethazine, dextromethorphan, other medicines, foods, dyes, or preservatives  · pregnant or trying to get pregnant  · breast-feeding  How should I use this medicine?  Take this medicine by mouth with a glass of water. Follow the directions on the prescription label. Use a specially marked spoon or container to measure your medicine. Household spoons are not accurate. Take your doses at regular intervals. Do not take your medicine more often than directed.  Talk to your pediatrician regarding the use of this medicine in children. Special care may be needed. Do not use this medicine in children less than 2 years of age.  Patients over 65 years old may have a stronger reaction and need a smaller dose.  Overdosage: If you think you have taken too much of this medicine contact a poison control center or emergency room at once.  NOTE: This  medicine is only for you. Do not share this medicine with others.  What if I miss a dose?  If you miss a dose, take it as soon as you can. If it is almost time for your next dose, take only that dose. Do not take double or extra doses.  What may interact with this medicine?  Do not take this medicine with any of the following medications:  · MAOIs like Carbex, Eldepryl, Marplan, Nardil, and Parnate  This medicine may also interact with the following medications:  · alcohol or alcohol-containing products  · antihistamines for allergy, cough, and cold  · atropine  · certain medicines for anxiety or sleep  · certain medicines for bladder problems like oxybutynin, tolterodine  · certain medicines for depression like amitriptyline, fluoxetine, sertraline  · certain medicines for Parkinson's disease like benztropine, trihexyphenidyl  · certain medicines for stomach problems like dicyclomine, hyoscyamine  · certain medicines for travel sickness like scopolamine  · epinephrine  · general anesthetics like halothane, isoflurane, methoxyflurane, propofol  · ipratropium  · medicines for depression, anxiety or psychotic disturbances  · medicines for high blood pressure  · medicines for seizures like phenobarbital, primidone, phenytoin  · medicines that relax muscles for surgery  · metoclopramide  · narcotic medicines for pain  This list may not describe all possible interactions. Give your health care provider a list of all the medicines, herbs, non-prescription drugs, or dietary supplements you use. Also tell them if you smoke, drink alcohol, or use illegal drugs. Some items may interact with your medicine.  What should I watch for while using this medicine?  Tell your doctor if your symptoms do not improve or if they get worse.  You may get drowsy or dizzy. Do not drive, use machinery, or do anything that needs mental alertness until you know how this medicine affects you. Do not stand or sit up quickly, especially if you are  an older patient. This reduces the risk of dizzy or fainting spells. Alcohol may interfere with the effect of this medicine. Avoid alcoholic drinks.  This medicine can make you more sensitive to the sun. Keep out of the sun. If you cannot avoid being in the sun, wear protective clothing and use sunscreen. Do not use sun lamps or tanning beds/booths.  What side effects may I notice from receiving this medicine?  Side effects that you should report to your doctor or health care professional as soon as possible:  · allergic reactions like skin rash, itching or hives, swelling of the face, lips, or tongue  · changes in vision  · confusion  · fever, chills, sore throat  · restlessness  · seizures  · signs and symptoms of high blood sugar such as being more thirsty or hungry or having to urinate more than normal. You may also feel very tired or have blurry vision.  · signs and symptoms of liver injury like dark yellow or brown urine; general ill feeling or flu-like symptoms; light-colored stools; loss of appetite; nausea; right upper belly pain; unusually weak or tired; yellowing of the eyes or skin  · signs and symptoms of low blood pressure like dizziness; feeling faint or lightheaded, falls; unusually weak or tired  · trouble passing urine or change in the amount of urine  · trouble swallowing  · uncontrollable movements of the arms, face, head, mouth, neck, or upper body  · unusual bruising or bleeding  · unusually weak or tired  Side effects that usually do not require medical attention (report to your doctor or health care professional if they continue or are bothersome):  · drowsiness  · dizziness  · dry mouth  · nausea  · upset stomach  This list may not describe all possible side effects. Call your doctor for medical advice about side effects. You may report side effects to FDA at 1-711-KRO-4183.  Where should I keep my medicine?  Keep out of the reach of children.  Store at room temperature between 20 and 25  degrees C (68 and 77 degrees F). Protect from light. Throw away any unused medicine after the expiration date.  NOTE: This sheet is a summary. It may not cover all possible information. If you have questions about this medicine, talk to your doctor, pharmacist, or health care provider.  © 2021 [employer] Standard (2020-11-05 15:13:18)     yes

## 2021-12-09 ENCOUNTER — TELEPHONE (OUTPATIENT)
Dept: FAMILY MEDICINE CLINIC | Facility: CLINIC | Age: 46
End: 2021-12-09

## 2021-12-09 DIAGNOSIS — M1A.9XX0 CHRONIC GOUT WITHOUT TOPHUS, UNSPECIFIED CAUSE, UNSPECIFIED SITE: Primary | ICD-10-CM

## 2021-12-09 RX ORDER — COLCHICINE 0.6 MG/1
0.6 CAPSULE ORAL 2 TIMES DAILY PRN
Qty: 30 CAPSULE | Refills: 3 | Status: SHIPPED | OUTPATIENT
Start: 2021-12-09 | End: 2022-08-11 | Stop reason: SDUPTHER

## 2021-12-09 NOTE — TELEPHONE ENCOUNTER
Caller: Deepak Albarran    Relationship: Self    Best call back number:[phone]    What medication are you requesting: MITIGARE 0.6 MG TABLETS    What are your current symptoms: NO SYMPTOMS CURRENTLY BUT HAD A GOUT FLARE UP ABOUT A WEEK AGO AND FINISHED THE LAST OF THIS MEDICATION    If a prescription is needed, what is your preferred pharmacy and phone number: [facility] [address] [place], [place] [address]Y AT [facility] - [phone] [facility] [phone] FX     Additional notes:  PATIENT STATED THIS WAS ORIGINALLY PRESCRIBED ABOUT 2 YEARS AGO AND ONLY USES IT AS NEEDED WHEN HE HAS GOUT FLARE UPS

## 2022-04-28 ENCOUNTER — TELEMEDICINE (OUTPATIENT)
Dept: FAMILY MEDICINE CLINIC | Facility: CLINIC | Age: 47
End: 2022-04-28

## 2022-04-28 DIAGNOSIS — B34.9 ACUTE VIRAL SYNDROME: Primary | ICD-10-CM

## 2022-04-28 PROCEDURE — 99214 OFFICE O/P EST MOD 30 MIN: CPT | Performed by: FAMILY MEDICINE

## 2022-04-28 RX ORDER — ALBUTEROL SULFATE 90 UG/1
2 AEROSOL, METERED RESPIRATORY (INHALATION) EVERY 4 HOURS PRN
Qty: 18 G | Refills: 0 | Status: SHIPPED | OUTPATIENT
Start: 2022-04-28

## 2022-04-28 RX ORDER — METHYLPREDNISOLONE 4 MG/1
TABLET ORAL
Qty: 21 TABLET | Refills: 0 | Status: SHIPPED | OUTPATIENT
Start: 2022-04-28 | End: 2022-05-03

## 2022-04-28 RX ORDER — DEXTROMETHORPHAN HYDROBROMIDE AND PROMETHAZINE HYDROCHLORIDE 15; 6.25 MG/5ML; MG/5ML
5 SYRUP ORAL 4 TIMES DAILY PRN
Qty: 240 ML | Refills: 0 | OUTPATIENT
Start: 2022-04-28 | End: 2022-05-06

## 2022-04-28 RX ORDER — BENZONATATE 100 MG/1
100 CAPSULE ORAL 3 TIMES DAILY PRN
Qty: 30 CAPSULE | Refills: 0 | OUTPATIENT
Start: 2022-04-28 | End: 2022-05-06

## 2022-04-28 NOTE — PROGRESS NOTES
Deepak Albarran is a 46 y.o. male who presents today for Cough    You have chosen to receive care through a telemedicine visit. Do you consent to use a telemedicine visit for your medical care today? Yes.    Cough  This is a new problem. The current episode started in the past 7 days (sore throat started tuesday morning and then cough followed). The problem has been gradually worsening. The cough is productive of sputum. Associated symptoms include chills, a fever (subjective), headaches, myalgias, nasal congestion (mild), a sore throat and sweats. Pertinent negatives include no chest pain, ear congestion, ear pain, heartburn, hemoptysis, postnasal drip, rash, rhinorrhea, shortness of breath, weight loss or wheezing. Risk factors: Patient's son has similar symptoms and had negative COVID and strep test. Treatments tried: NSAIDs, nyquil. The treatment provided no relief. There is no history of asthma, bronchiectasis, bronchitis, COPD, emphysema, environmental allergies or pneumonia.        Review of Systems   Constitutional: Positive for chills, fatigue and fever (subjective). Negative for unexpected weight loss.   HENT: Positive for sore throat and voice change. Negative for ear pain, postnasal drip, rhinorrhea, sinus pressure and trouble swallowing.    Respiratory: Positive for cough and chest tightness. Negative for hemoptysis, shortness of breath and wheezing.    Cardiovascular: Negative for chest pain and palpitations.   Gastrointestinal: Negative for constipation, diarrhea, nausea and vomiting.   Genitourinary: Negative for difficulty urinating, dysuria and frequency.   Musculoskeletal: Positive for myalgias.   Skin: Negative for rash.   Allergic/Immunologic: Negative for environmental allergies.   Neurological: Positive for headache.        The following portions of the patient's history were reviewed and updated as appropriate: allergies, current medications, past family history, past medical history, past  social history, past surgical history and problem list.    Current Outpatient Medications on File Prior to Visit   Medication Sig Dispense Refill   • allopurinol (ZYLOPRIM) 300 MG tablet Take 1 tablet by mouth Daily. 90 tablet 3   • colchicine (Mitigare) 0.6 MG capsule capsule Take 1 capsule by mouth 2 (Two) Times a Day As Needed (gout flare). 30 capsule 3   • losartan (COZAAR) 100 MG tablet Take 1 tablet by mouth Daily. 90 tablet 3   • [DISCONTINUED] albuterol sulfate  (90 Base) MCG/ACT inhaler Inhale 2 puffs Every 4 (Four) Hours As Needed for Wheezing or Shortness of Air. 18 g 0   • [DISCONTINUED] methylPREDNISolone (MEDROL) 4 MG dose pack Take as directed on package instructions. 21 each 0   • [DISCONTINUED] promethazine-dextromethorphan (PROMETHAZINE-DM) 6.25-15 MG/5ML syrup Take 5 mL by mouth At Night As Needed for Cough. 118 mL 0     No current facility-administered medications on file prior to visit.       No Known Allergies     There were no vitals taken for this visit.     Physical Exam  Constitutional:       General: He is not in acute distress.     Appearance: Normal appearance. He is not ill-appearing, toxic-appearing or diaphoretic.   Pulmonary:      Effort: Pulmonary effort is normal. No respiratory distress.   Neurological:      General: No focal deficit present.      Mental Status: He is alert. Mental status is at baseline.   Psychiatric:         Mood and Affect: Mood normal.         Behavior: Behavior normal.               Problems Addressed this Visit        Other    Acute viral syndrome - Primary     Signs and symptoms consistent with acute viral syndrome.  Patient does not feel that he has COVID as his son has similar symptoms and just tested negative.  We will treat patient symptomatically.  RTC/ED precautions given.           Relevant Medications    methylPREDNISolone (MEDROL) 4 MG dose pack    benzonatate (Tessalon Perles) 100 MG capsule    promethazine-dextromethorphan  (PROMETHAZINE-DM) 6.25-15 MG/5ML syrup    albuterol sulfate  (90 Base) MCG/ACT inhaler      Diagnoses       Codes Comments    Acute viral syndrome    -  Primary ICD-10-CM: B34.9  ICD-9-CM: 079.99           Return if symptoms worsen or fail to improve.    This was an audio and video enabled telemedicine encounter.    Nii Child MD   4/28/2022

## 2022-04-28 NOTE — ASSESSMENT & PLAN NOTE
Signs and symptoms consistent with acute viral syndrome.  Patient does not feel that he has COVID as his son has similar symptoms and just tested negative.  We will treat patient symptomatically.  RTC/ED precautions given.

## 2022-04-28 NOTE — PATIENT INSTRUCTIONS
Viral Respiratory Infection  A respiratory infection is an illness that affects part of the respiratory system, such as the lungs, nose, or throat. A respiratory infection that is caused by a virus is called a viral respiratory infection.  Common types of viral respiratory infections include:  A cold.  The flu (influenza).  A respiratory syncytial virus (RSV) infection.  What are the causes?  This condition is caused by a virus.  What are the signs or symptoms?  Symptoms of this condition include:  A stuffy or runny nose.  Yellow or green nasal discharge.  A cough.  Sneezing.  Fatigue.  Achy muscles.  A sore throat.  Sweating or chills.  A fever.  A headache.  How is this diagnosed?  This condition may be diagnosed based on:  Your symptoms.  A physical exam.  Testing of nasal swabs.  How is this treated?  This condition may be treated with medicines, such as:  Antiviral medicine. This may shorten the length of time a person has symptoms.  Expectorants. These make it easier to cough up mucus.  Decongestant nasal sprays.  Acetaminophen or NSAIDs to relieve fever and pain.  Antibiotic medicines are not prescribed for viral infections. This is because antibiotics are designed to kill bacteria. They are not effective against viruses.  Follow these instructions at home:    Managing pain and congestion  Take over-the-counter and prescription medicines only as told by your health care provider.  If you have a sore throat, gargle with a salt-water mixture 3-4 times a day or as needed. To make a salt-water mixture, completely dissolve ½-1 tsp of salt in 1 cup of warm water.  Use nose drops made from salt water to ease congestion and soften raw skin around your nose.  Drink enough fluid to keep your urine pale yellow. This helps prevent dehydration and helps loosen up mucus.  General instructions  Rest as much as possible.  Do not drink alcohol.  Do not use any products that contain nicotine or tobacco, such as cigarettes and  e-cigarettes. If you need help quitting, ask your health care provider.  Keep all follow-up visits as told by your health care provider. This is important.  How is this prevented?    Get an annual flu shot. You may get the flu shot in late summer, fall, or winter. Ask your health care provider when you should get your flu shot.  Avoid exposing others to your respiratory infection.  Stay home from work or school as told by your health care provider.  Wash your hands with soap and water often, especially after you cough or sneeze. If soap and water are not available, use alcohol-based hand .  Avoid contact with people who are sick during cold and flu season. This is generally fall and winter.  Contact a health care provider if:  Your symptoms last for 10 days or longer.  Your symptoms get worse over time.  You have a fever.  You have severe sinus pain in your face or forehead.  The glands in your jaw or neck become very swollen.  Get help right away if you:  Feel pain or pressure in your chest.  Have shortness of breath.  Faint or feel like you will faint.  Have severe and persistent vomiting.  Feel confused or disoriented.  Summary  A respiratory infection is an illness that affects part of the respiratory system, such as the lungs, nose, or throat. A respiratory infection that is caused by a virus is called a viral respiratory infection.  Common types of viral respiratory infections are a cold, influenza, and respiratory syncytial virus (RSV) infection.  Symptoms of this condition include a stuffy or runny nose, cough, sneezing, fatigue, achy muscles, sore throat, and fevers or chills.  Antibiotic medicines are not prescribed for viral infections. This is because antibiotics are designed to kill bacteria. They are not effective against viruses.  This information is not intended to replace advice given to you by your health care provider. Make sure you discuss any questions you have with your health care  provider.  Document Released: 09/27/2006 Document Revised: 01/28/2019 Document Reviewed: 01/28/2019  [employer] Interactive Patient Education © 2019 [employer]

## 2022-05-06 ENCOUNTER — TELEPHONE (OUTPATIENT)
Dept: URGENT CARE | Facility: CLINIC | Age: 47
End: 2022-05-06

## 2022-05-06 ENCOUNTER — HOSPITAL ENCOUNTER (OUTPATIENT)
Dept: GENERAL RADIOLOGY | Facility: HOSPITAL | Age: 47
Discharge: HOME | End: 2022-05-06
Admitting: NURSE PRACTITIONER

## 2022-05-06 DIAGNOSIS — R05.9 COUGH: ICD-10-CM

## 2022-05-06 DIAGNOSIS — R07.81 RIB PAIN ON RIGHT SIDE: ICD-10-CM

## 2022-05-06 DIAGNOSIS — M62.838 MUSCLE SPASM: ICD-10-CM

## 2022-05-06 PROCEDURE — 71101 X-RAY EXAM UNILAT RIBS/CHEST: CPT

## 2022-05-06 PROCEDURE — U0004 COV-19 TEST NON-CDC HGH THRU: HCPCS | Performed by: NURSE PRACTITIONER

## 2022-05-06 NOTE — PROGRESS NOTES
Narrative & Impression  DATE OF EXAM: 5/6/2022 8:51 AM    PROCEDURE: XR RIBS RIGHT W PA CHEST-    INDICATIONS: Cough for 12 days with posterior right rib pain/spasm with  cough.    COMPARISON: Chest radiographs 1/18/2019.    TECHNIQUE: AP view of the chest was obtained with 4 views of the right  ribs.    FINDINGS:  Stable mild elevation of the right hemidiaphragm. Lungs otherwise clear.  No pneumothorax. Cardiomediastinal contours within normal limits.    No right rib fracture is identified. No acute osseous abnormality.     IMPRESSION:    1. No active cardiopulmonary disease.  2. No right rib fracture is identified.    This report was finalized on 5/6/2022 9:07 AM by Bronson Gr MD.

## 2022-05-06 NOTE — TELEPHONE ENCOUNTER
Reviewed x-ray results with patient.  Advised patient to avoid lifting, pushing, pulling, or tugging for the next several days, he can use ice or heat for rib spasms.  Follow-up with primary care as needed.

## 2022-08-11 ENCOUNTER — OFFICE VISIT (OUTPATIENT)
Dept: FAMILY MEDICINE CLINIC | Facility: CLINIC | Age: 47
End: 2022-08-11

## 2022-08-11 ENCOUNTER — LAB (OUTPATIENT)
Dept: LAB | Facility: HOSPITAL | Age: 47
End: 2022-08-11

## 2022-08-11 VITALS
TEMPERATURE: 98.6 F | DIASTOLIC BLOOD PRESSURE: 82 MMHG | HEIGHT: 73 IN | SYSTOLIC BLOOD PRESSURE: 138 MMHG | BODY MASS INDEX: 28.76 KG/M2 | OXYGEN SATURATION: 99 % | WEIGHT: 217 LBS | HEART RATE: 90 BPM

## 2022-08-11 DIAGNOSIS — I10 ESSENTIAL HYPERTENSION: ICD-10-CM

## 2022-08-11 DIAGNOSIS — M1A.9XX0 CHRONIC GOUT WITHOUT TOPHUS, UNSPECIFIED CAUSE, UNSPECIFIED SITE: ICD-10-CM

## 2022-08-11 DIAGNOSIS — M1A.0790 CHRONIC IDIOPATHIC GOUT INVOLVING TOE WITHOUT TOPHUS, UNSPECIFIED LATERALITY: ICD-10-CM

## 2022-08-11 DIAGNOSIS — Z12.11 COLON CANCER SCREENING: ICD-10-CM

## 2022-08-11 DIAGNOSIS — Z00.00 WELL ADULT EXAM: Primary | ICD-10-CM

## 2022-08-11 LAB
ALBUMIN SERPL-MCNC: 4.5 G/DL (ref 3.5–5.2)
ALBUMIN/GLOB SERPL: 2.1 G/DL
ALP SERPL-CCNC: 83 U/L (ref 39–117)
ALT SERPL W P-5'-P-CCNC: 84 U/L (ref 1–41)
ANION GAP SERPL CALCULATED.3IONS-SCNC: 11 MMOL/L (ref 5–15)
AST SERPL-CCNC: 76 U/L (ref 1–40)
BASOPHILS # BLD AUTO: 0.04 10*3/MM3 (ref 0–0.2)
BASOPHILS NFR BLD AUTO: 1.2 % (ref 0–1.5)
BILIRUB SERPL-MCNC: 0.8 MG/DL (ref 0–1.2)
BUN SERPL-MCNC: 9 MG/DL (ref 6–20)
BUN/CREAT SERPL: 9.9 (ref 7–25)
CALCIUM SPEC-SCNC: 9.5 MG/DL (ref 8.6–10.5)
CHLORIDE SERPL-SCNC: 100 MMOL/L (ref 98–107)
CHOLEST SERPL-MCNC: 193 MG/DL (ref 0–200)
CO2 SERPL-SCNC: 26 MMOL/L (ref 22–29)
CREAT SERPL-MCNC: 0.91 MG/DL (ref 0.76–1.27)
DEPRECATED RDW RBC AUTO: 46.3 FL (ref 37–54)
EGFRCR SERPLBLD CKD-EPI 2021: 103.3 ML/MIN/1.73
EOSINOPHIL # BLD AUTO: 0.08 10*3/MM3 (ref 0–0.4)
EOSINOPHIL NFR BLD AUTO: 2.4 % (ref 0.3–6.2)
ERYTHROCYTE [DISTWIDTH] IN BLOOD BY AUTOMATED COUNT: 13.1 % (ref 12.3–15.4)
GLOBULIN UR ELPH-MCNC: 2.1 GM/DL
GLUCOSE SERPL-MCNC: 81 MG/DL (ref 65–99)
HCT VFR BLD AUTO: 45.7 % (ref 37.5–51)
HDLC SERPL-MCNC: 41 MG/DL (ref 40–60)
HGB BLD-MCNC: 16.3 G/DL (ref 13–17.7)
IMM GRANULOCYTES # BLD AUTO: 0.01 10*3/MM3 (ref 0–0.05)
IMM GRANULOCYTES NFR BLD AUTO: 0.3 % (ref 0–0.5)
LDLC SERPL CALC-MCNC: 122 MG/DL (ref 0–100)
LDLC/HDLC SERPL: 2.89 {RATIO}
LYMPHOCYTES # BLD AUTO: 1.11 10*3/MM3 (ref 0.7–3.1)
LYMPHOCYTES NFR BLD AUTO: 32.9 % (ref 19.6–45.3)
MCH RBC QN AUTO: 34.6 PG (ref 26.6–33)
MCHC RBC AUTO-ENTMCNC: 35.7 G/DL (ref 31.5–35.7)
MCV RBC AUTO: 97 FL (ref 79–97)
MONOCYTES # BLD AUTO: 0.32 10*3/MM3 (ref 0.1–0.9)
MONOCYTES NFR BLD AUTO: 9.5 % (ref 5–12)
NEUTROPHILS NFR BLD AUTO: 1.81 10*3/MM3 (ref 1.7–7)
NEUTROPHILS NFR BLD AUTO: 53.7 % (ref 42.7–76)
NRBC BLD AUTO-RTO: 0 /100 WBC (ref 0–0.2)
PLATELET # BLD AUTO: 152 10*3/MM3 (ref 140–450)
PMV BLD AUTO: 9.9 FL (ref 6–12)
POTASSIUM SERPL-SCNC: 4.5 MMOL/L (ref 3.5–5.2)
PROT SERPL-MCNC: 6.6 G/DL (ref 6–8.5)
RBC # BLD AUTO: 4.71 10*6/MM3 (ref 4.14–5.8)
SODIUM SERPL-SCNC: 137 MMOL/L (ref 136–145)
TRIGL SERPL-MCNC: 167 MG/DL (ref 0–150)
URATE SERPL-MCNC: 6.2 MG/DL (ref 3.4–7)
VLDLC SERPL-MCNC: 30 MG/DL (ref 5–40)
WBC NRBC COR # BLD: 3.37 10*3/MM3 (ref 3.4–10.8)

## 2022-08-11 PROCEDURE — 80061 LIPID PANEL: CPT | Performed by: FAMILY MEDICINE

## 2022-08-11 PROCEDURE — 85025 COMPLETE CBC W/AUTO DIFF WBC: CPT | Performed by: FAMILY MEDICINE

## 2022-08-11 PROCEDURE — 80053 COMPREHEN METABOLIC PANEL: CPT | Performed by: FAMILY MEDICINE

## 2022-08-11 PROCEDURE — 99396 PREV VISIT EST AGE 40-64: CPT | Performed by: FAMILY MEDICINE

## 2022-08-11 PROCEDURE — 83036 HEMOGLOBIN GLYCOSYLATED A1C: CPT | Performed by: FAMILY MEDICINE

## 2022-08-11 PROCEDURE — 84550 ASSAY OF BLOOD/URIC ACID: CPT | Performed by: FAMILY MEDICINE

## 2022-08-11 RX ORDER — COLCHICINE 0.6 MG/1
0.6 CAPSULE ORAL 2 TIMES DAILY PRN
Qty: 30 CAPSULE | Refills: 3 | Status: SHIPPED | OUTPATIENT
Start: 2022-08-11

## 2022-08-11 RX ORDER — LOSARTAN POTASSIUM 100 MG/1
100 TABLET ORAL DAILY
Qty: 90 TABLET | Refills: 3 | Status: SHIPPED | OUTPATIENT
Start: 2022-08-11

## 2022-08-11 RX ORDER — ALLOPURINOL 300 MG/1
300 TABLET ORAL DAILY
Qty: 90 TABLET | Refills: 3 | Status: SHIPPED | OUTPATIENT
Start: 2022-08-11

## 2022-08-11 NOTE — PATIENT INSTRUCTIONS
"Hypertension, Adult  High blood pressure (hypertension) is when the force of blood pumping through the arteries is too strong. The arteries are the blood vessels that carry blood from the heart throughout the body. Hypertension forces the heart to work harder to pump blood and may cause arteries to become narrow or stiff. Untreated or uncontrolled hypertension can cause a heart attack, heart failure, a stroke, kidney disease, and other problems.  A blood pressure reading consists of a higher number over a lower number. Ideally, your blood pressure should be below 120/80. The first (\"top\") number is called the systolic pressure. It is a measure of the pressure in your arteries as your heart beats. The second (\"bottom\") number is called the diastolic pressure. It is a measure of the pressure in your arteries as the heart relaxes.  What are the causes?  The exact cause of this condition is not known. There are some conditions that result in or are related to high blood pressure.  What increases the risk?  Some risk factors for high blood pressure are under your control. The following factors may make you more likely to develop this condition:  Smoking.  Having type 2 diabetes mellitus, high cholesterol, or both.  Not getting enough exercise or physical activity.  Being overweight.  Having too much fat, sugar, calories, or salt (sodium) in your diet.  Drinking too much alcohol.  Some risk factors for high blood pressure may be difficult or impossible to change. Some of these factors include:  Having chronic kidney disease.  Having a family history of high blood pressure.  Age. Risk increases with age.  Race. You may be at higher risk if you are .  Gender. Men are at higher risk than women before age 45. After age 65, women are at higher risk than men.  Having obstructive sleep apnea.  Stress.  What are the signs or symptoms?  High blood pressure may not cause symptoms. Very high blood pressure " (hypertensive crisis) may cause:  Headache.  Anxiety.  Shortness of breath.  Nosebleed.  Nausea and vomiting.  Vision changes.  Severe chest pain.  Seizures.  How is this diagnosed?  This condition is diagnosed by measuring your blood pressure while you are seated, with your arm resting on a flat surface, your legs uncrossed, and your feet flat on the floor. The cuff of the blood pressure monitor will be placed directly against the skin of your upper arm at the level of your heart. It should be measured at least twice using the same arm. Certain conditions can cause a difference in blood pressure between your right and left arms.  Certain factors can cause blood pressure readings to be lower or higher than normal for a short period of time:  When your blood pressure is higher when you are in a health care provider's office than when you are at home, this is called white coat hypertension. Most people with this condition do not need medicines.  When your blood pressure is higher at home than when you are in a health care provider's office, this is called masked hypertension. Most people with this condition may need medicines to control blood pressure.  If you have a high blood pressure reading during one visit or you have normal blood pressure with other risk factors, you may be asked to:  Return on a different day to have your blood pressure checked again.  Monitor your blood pressure at home for 1 week or longer.  If you are diagnosed with hypertension, you may have other blood or imaging tests to help your health care provider understand your overall risk for other conditions.  How is this treated?  This condition is treated by making healthy lifestyle changes, such as eating healthy foods, exercising more, and reducing your alcohol intake. Your health care provider may prescribe medicine if lifestyle changes are not enough to get your blood pressure under control, and if:  Your systolic blood pressure is above  130.  Your diastolic blood pressure is above 80.  Your personal target blood pressure may vary depending on your medical conditions, your age, and other factors.  Follow these instructions at home:  Eating and drinking    Eat a diet that is high in fiber and potassium, and low in sodium, added sugar, and fat. An example eating plan is called the DASH (Dietary Approaches to Stop Hypertension) diet. To eat this way:  Eat plenty of fresh fruits and vegetables. Try to fill one half of your plate at each meal with fruits and vegetables.  Eat whole grains, such as whole-wheat pasta, brown rice, or whole-grain bread. Fill about one fourth of your plate with whole grains.  Eat or drink low-fat dairy products, such as skim milk or low-fat yogurt.  Avoid fatty cuts of meat, processed or cured meats, and poultry with skin. Fill about one fourth of your plate with lean proteins, such as fish, chicken without skin, beans, eggs, or tofu.  Avoid pre-made and processed foods. These tend to be higher in sodium, added sugar, and fat.  Reduce your daily sodium intake. Most people with hypertension should eat less than 1,500 mg of sodium a day.  Do not drink alcohol if:  Your health care provider tells you not to drink.  You are pregnant, may be pregnant, or are planning to become pregnant.  If you drink alcohol:  Limit how much you use to:  0-1 drink a day for women.  0-2 drinks a day for men.  Be aware of how much alcohol is in your drink. In the [place] one drink equals one 12 oz bottle of beer (355 mL), one 5 oz glass of wine (148 mL), or one 1½ oz glass of hard liquor (44 mL).    Lifestyle    Work with your health care provider to maintain a healthy body weight or to lose weight. Ask what an ideal weight is for you.  Get at least 30 minutes of exercise most days of the week. Activities may include walking, swimming, or biking.  Include exercise to strengthen your muscles (resistance exercise), such as Pilates or lifting weights, as  part of your weekly exercise routine. Try to do these types of exercises for 30 minutes at least 3 days a week.  Do not use any products that contain nicotine or tobacco, such as cigarettes, e-cigarettes, and chewing tobacco. If you need help quitting, ask your health care provider.  Monitor your blood pressure at home as told by your health care provider.  Keep all follow-up visits as told by your health care provider. This is important.    Medicines  Take over-the-counter and prescription medicines only as told by your health care provider. Follow directions carefully. Blood pressure medicines must be taken as prescribed.  Do not skip doses of blood pressure medicine. Doing this puts you at risk for problems and can make the medicine less effective.  Ask your health care provider about side effects or reactions to medicines that you should watch for.  Contact a health care provider if you:  Think you are having a reaction to a medicine you are taking.  Have headaches that keep coming back (recurring).  Feel dizzy.  Have swelling in your ankles.  Have trouble with your vision.  Get help right away if you:  Develop a severe headache or confusion.  Have unusual weakness or numbness.  Feel faint.  Have severe pain in your chest or abdomen.  Vomit repeatedly.  Have trouble breathing.  Summary  Hypertension is when the force of blood pumping through your arteries is too strong. If this condition is not controlled, it may put you at risk for serious complications.  Your personal target blood pressure may vary depending on your medical conditions, your age, and other factors. For most people, a normal blood pressure is less than 120/80.  Hypertension is treated with lifestyle changes, medicines, or a combination of both. Lifestyle changes include losing weight, eating a healthy, low-sodium diet, exercising more, and limiting alcohol.  This information is not intended to replace advice given to you by your health care  "provider. Make sure you discuss any questions you have with your health care provider.  Document Revised: 08/28/2019 Document Reviewed: 08/28/2019  [employer] Patient Education © 2021 [employer]  BMI for Adults  What is BMI?  Body mass index (BMI) is a number that is calculated from a person's weight and height. BMI can help estimate how much of a person's weight is composed of fat. BMI does not measure body fat directly. Rather, it is an alternative to procedures that directly measure body fat, which can be difficult and expensive.  BMI can help identify people who may be at higher risk for certain medical problems.  What are BMI measurements used for?  BMI is used as a screening tool to identify possible weight problems. It helps determine whether a person is obese, overweight, a healthy weight, or underweight.  BMI is useful for:  Identifying a weight problem that may be related to a medical condition or may increase the risk for medical problems.  Promoting changes, such as changes in diet and exercise, to help reach a healthy weight. BMI screening can be repeated to see if these changes are working.  How is BMI calculated?  BMI involves measuring your weight in relation to your height. Both height and weight are measured, and the BMI is calculated from those numbers. This can be done either in English ([place] or metric measurements. Note that charts and online BMI calculators are available to help you find your BMI quickly and easily without having to do these calculations yourself.  To calculate your BMI in English ([place] measurements:    Measure your weight in pounds (lb).  Multiply the number of pounds by 703.  For example, for a person who weighs 180 lb, multiply that number by 703, which equals 126,540.  Measure your height in inches. Then multiply that number by itself to get a measurement called \"inches squared.\"  For example, for a person who is 70 inches tall, the \"inches squared\" measurement is 70 " "inches x 70 inches, which equals 4,900 inches squared.  Divide the total from step 2 (number of lb x 703) by the total from step 3 (inches squared): 126,540 ÷ 4,900 = 25.8. This is your BMI.    To calculate your BMI in metric measurements:  Measure your weight in kilograms (kg).  Measure your height in meters (m). Then multiply that number by itself to get a measurement called \"meters squared.\"  For example, for a person who is 1.75 m tall, the \"meters squared\" measurement is 1.75 m x 1.75 m, which is equal to 3.1 meters squared.  Divide the number of kilograms (your weight) by the meters squared number. In this example: 70 ÷ 3.1 = 22.6. This is your BMI.  What do the results mean?  BMI charts are used to identify whether you are underweight, normal weight, overweight, or obese. The following guidelines will be used:  Underweight: BMI less than 18.5.  Normal weight: BMI between 18.5 and 24.9.  Overweight: BMI between 25 and 29.9.  Obese: BMI of 30 or above.  Keep these notes in mind:  Weight includes both fat and muscle, so someone with a muscular build, such as an athlete, may have a BMI that is higher than 24.9. In cases like these, BMI is not an accurate measure of body fat.  To determine if excess body fat is the cause of a BMI of 25 or higher, further assessments may need to be done by a health care provider.  BMI is usually interpreted in the same way for men and women.  Where to find more information  For more information about BMI, including tools to quickly calculate your BMI, go to these websites:  Centers for Disease Control and Prevention: www.cdc.gov  American Heart Association: www.heart.org  National Heart, Lung, and [employer] [place]: www.nhlbi.nih.gov  Summary  Body mass index (BMI) is a number that is calculated from a person's weight and height.  BMI may help estimate how much of a person's weight is composed of fat. BMI can help identify those who may be at higher risk for certain medical " problems.  BMI can be measured using English measurements or metric measurements.  BMI charts are used to identify whether you are underweight, normal weight, overweight, or obese.  This information is not intended to replace advice given to you by your health care provider. Make sure you discuss any questions you have with your health care provider.  Document Revised: 09/09/2020 Document Reviewed: 07/17/2020  [employer] Patient Education © 2021 [employer]

## 2022-08-11 NOTE — PROGRESS NOTES
Deepak Albarran is a 47 y.o. male who presents today to complete annual exam.    Chief Complaint   Patient presents with   • Annual Exam        Patient is here for annual and needs refills on medications. He has no acute complaints today. He has been exercising 3-4 times a week walking for an hour at time. He eats a varied diet but has a high carb intake. He sees the dentist and optometrist regularly but has not seen dermatology.        Review of Systems   Constitutional: Negative for fever and unexpected weight loss.   HENT: Negative for congestion, ear pain and sore throat.    Eyes: Negative for visual disturbance.   Respiratory: Negative for cough, shortness of breath and wheezing.    Cardiovascular: Negative for chest pain and palpitations.   Gastrointestinal: Negative for abdominal pain, blood in stool, constipation, diarrhea, nausea, vomiting and GERD.   Endocrine: Negative for polydipsia and polyuria.   Genitourinary: Negative for difficulty urinating.   Musculoskeletal: Negative for joint swelling.   Skin: Negative for rash and skin lesions.   Allergic/Immunologic: Negative for environmental allergies.   Neurological: Negative for seizures and syncope.   Hematological: Does not bruise/bleed easily.   Psychiatric/Behavioral: Negative for suicidal ideas.        PHQ-9 Depression Screening  Little interest or pleasure in doing things? 0-->not at all   Feeling down, depressed, or hopeless? 0-->not at all   Trouble falling or staying asleep, or sleeping too much?     Feeling tired or having little energy?     Poor appetite or overeating?     Feeling bad about yourself - or that you are a failure or have let yourself or your family down?     Trouble concentrating on things, such as reading the newspaper or watching television?     Moving or speaking so slowly that other people could have noticed? Or the opposite - being so fidgety or restless that you have been moving around a lot more than usual?     Thoughts  that you would be better off dead, or of hurting yourself in some way?     PHQ-9 Total Score 0   If you checked off any problems, how difficult have these problems made it for you to do your work, take care of things at home, or get along with other people?         Past Medical History:   Diagnosis Date   • Gout    • HTN (hypertension)         Past Surgical History:   Procedure Laterality Date   • LASIK Bilateral    • LIPOMA RESECTION     • WISDOM TOOTH EXTRACTION          Family History   Problem Relation Age of Onset   • No Known Problems Mother    • Osteoarthritis Father    • Hearing loss Father    • No Known Problems Sister    • Epilepsy Son    • Leukemia Maternal Grandmother    • Other Maternal Grandfather         unknown   • Lung cancer Paternal Grandmother    • Other Paternal Grandfather         unknown        Social History     Socioeconomic History   • Marital status: Single     Spouse name: N/A   • Number of children: 1   • Years of education: College   • Highest education level: Bachelor's degree (e.g., BA, AB, BS)   Tobacco Use   • Smoking status: Former Smoker     Packs/day: 1.00     Years: 10.00     Pack years: 10.00     Types: Cigarettes     Start date:      Quit date: 2003     Years since quittin.1   • Smokeless tobacco: Current User     Types: Snuff   Vaping Use   • Vaping Use: Never used   Substance and Sexual Activity   • Alcohol use: Yes     Alcohol/week: 24.0 standard drinks     Types: 12 Glasses of wine, 12 Shots of liquor per week   • Drug use: No   • Sexual activity: Yes     Partners: Female     Birth control/protection: Post-menopausal, Condom     Comment: 5 female partners in the last year, intermittant condom use.        Current Outpatient Medications on File Prior to Visit   Medication Sig Dispense Refill   • albuterol sulfate  (90 Base) MCG/ACT inhaler Inhale 2 puffs Every 4 (Four) Hours As Needed for Wheezing or Shortness of Air. 18 g 0   • [DISCONTINUED]  "allopurinol (ZYLOPRIM) 300 MG tablet Take 1 tablet by mouth Daily. 90 tablet 3   • [DISCONTINUED] colchicine (Mitigare) 0.6 MG capsule capsule Take 1 capsule by mouth 2 (Two) Times a Day As Needed (gout flare). 30 capsule 3   • [DISCONTINUED] losartan (COZAAR) 100 MG tablet Take 1 tablet by mouth Daily. 90 tablet 3     No current facility-administered medications on file prior to visit.       No Known Allergies     Visit Vitals  /82   Pulse 90   Temp 98.6 °F (37 °C)   Ht 185.4 cm (73\")   Wt 98.4 kg (217 lb)   SpO2 99%   BMI 28.63 kg/m²      Body mass index is 28.63 kg/m².    Physical Exam  Constitutional:       General: He is not in acute distress.     Appearance: He is well-developed. He is not diaphoretic.   HENT:      Head: Atraumatic.   Cardiovascular:      Rate and Rhythm: Normal rate and regular rhythm.      Heart sounds: Normal heart sounds. No murmur heard.    No friction rub. No gallop.   Pulmonary:      Effort: Pulmonary effort is normal. No respiratory distress.      Breath sounds: Normal breath sounds. No stridor. No wheezing, rhonchi or rales.   Abdominal:      General: Bowel sounds are normal. There is no distension.      Palpations: Abdomen is soft. There is no mass.      Tenderness: There is no abdominal tenderness. There is no guarding or rebound.      Hernia: No hernia is present.   Musculoskeletal:      Cervical back: Normal range of motion and neck supple.   Skin:     General: Skin is warm and dry.   Neurological:      Mental Status: He is alert and oriented to person, place, and time.   Psychiatric:         Behavior: Behavior normal.          Problems Addressed this Visit        Gastrointestinal Abdominal     Colon cancer screening    Relevant Orders    Cologuard - Stool, Per Rectum       Health Encounters    Well adult exam - Primary     The patient is here for health maintenance visit.  Currently, the patient consumes a unhealthy diet and has an adequate exercise regimen.  Screening lab " work is ordered.  Immunizations were reviewed today.  Advice and education was given regarding nutrition, aerobic exercise, routine dental evaluations, routine eye exams, reproductive health, cardiovascular risk reduction, sunscreen use, self skin examination (annual dermatology evaluations) and seatbelt use (general overall safety).  Further recommendations will be given if needed after lab evaluation.  Annual wellness evaluation is recommended.           Relevant Orders    Comprehensive Metabolic Panel    CBC & Differential    Hemoglobin A1c    Lipid Panel    Uric Acid       Musculoskeletal and Injuries    Gout    Relevant Medications    allopurinol (ZYLOPRIM) 300 MG tablet    colchicine (Mitigare) 0.6 MG capsule capsule    Other Relevant Orders    Uric Acid      Other Visit Diagnoses     Essential hypertension        Relevant Medications    losartan (COZAAR) 100 MG tablet    Other Relevant Orders    Comprehensive Metabolic Panel    CBC & Differential    Hemoglobin A1c    Lipid Panel      Diagnoses       Codes Comments    Well adult exam    -  Primary ICD-10-CM: Z00.00  ICD-9-CM: V70.0     Essential hypertension     ICD-10-CM: I10  ICD-9-CM: 401.9     Chronic idiopathic gout involving toe without tophus, unspecified laterality     ICD-10-CM: M1A.0790  ICD-9-CM: 274.02     Chronic gout without tophus, unspecified cause, unspecified site     ICD-10-CM: M1A.9XX0  ICD-9-CM: 274.02     Colon cancer screening     ICD-10-CM: Z12.11  ICD-9-CM: V76.51           Return in about 1 year (around 8/11/2023) for Annual.    Nii Child MD  8/11/2022

## 2022-08-12 LAB — HBA1C MFR BLD: 4.8 % (ref 4.8–5.6)

## 2022-09-28 ENCOUNTER — TELEPHONE (OUTPATIENT)
Dept: FAMILY MEDICINE CLINIC | Facility: CLINIC | Age: 47
End: 2022-09-28

## 2022-09-28 NOTE — TELEPHONE ENCOUNTER
Hub staff attempted to follow warm transfer process and was unsuccessful     Caller: Deepak Albarran    Relationship to patient: Self    Best call back number: [phone]    Patient is needing: RETURNING A CALL ABOUT HIS LABS

## 2023-07-24 DIAGNOSIS — I10 ESSENTIAL HYPERTENSION: ICD-10-CM

## 2023-07-24 RX ORDER — LOSARTAN POTASSIUM 100 MG/1
100 TABLET ORAL DAILY
Qty: 90 TABLET | Refills: 3 | Status: SHIPPED | OUTPATIENT
Start: 2023-07-24

## 2023-08-13 DIAGNOSIS — M1A.0790 CHRONIC IDIOPATHIC GOUT INVOLVING TOE WITHOUT TOPHUS, UNSPECIFIED LATERALITY: ICD-10-CM

## 2023-08-14 ENCOUNTER — LAB (OUTPATIENT)
Dept: LAB | Facility: HOSPITAL | Age: 48
End: 2023-08-14
Payer: COMMERCIAL

## 2023-08-14 ENCOUNTER — OFFICE VISIT (OUTPATIENT)
Dept: FAMILY MEDICINE CLINIC | Facility: CLINIC | Age: 48
End: 2023-08-14
Payer: COMMERCIAL

## 2023-08-14 VITALS
SYSTOLIC BLOOD PRESSURE: 122 MMHG | OXYGEN SATURATION: 98 % | WEIGHT: 214 LBS | TEMPERATURE: 97.8 F | HEART RATE: 77 BPM | DIASTOLIC BLOOD PRESSURE: 88 MMHG | BODY MASS INDEX: 28.36 KG/M2 | HEIGHT: 73 IN

## 2023-08-14 DIAGNOSIS — M1A.0790 CHRONIC IDIOPATHIC GOUT INVOLVING TOE WITHOUT TOPHUS, UNSPECIFIED LATERALITY: ICD-10-CM

## 2023-08-14 DIAGNOSIS — I10 PRIMARY HYPERTENSION: ICD-10-CM

## 2023-08-14 DIAGNOSIS — Z00.00 WELL ADULT EXAM: Primary | ICD-10-CM

## 2023-08-14 DIAGNOSIS — Z12.5 PROSTATE CANCER SCREENING: ICD-10-CM

## 2023-08-14 DIAGNOSIS — Z00.00 WELL ADULT EXAM: ICD-10-CM

## 2023-08-14 PROBLEM — R00.2 PALPITATIONS: Status: RESOLVED | Noted: 2021-07-11 | Resolved: 2023-08-14

## 2023-08-14 LAB
ALBUMIN SERPL-MCNC: 4.5 G/DL (ref 3.5–5.2)
ALBUMIN/GLOB SERPL: 1.8 G/DL
ALP SERPL-CCNC: 99 U/L (ref 39–117)
ALT SERPL W P-5'-P-CCNC: 63 U/L (ref 1–41)
ANION GAP SERPL CALCULATED.3IONS-SCNC: 13.3 MMOL/L (ref 5–15)
AST SERPL-CCNC: 55 U/L (ref 1–40)
BASOPHILS # BLD AUTO: 0.04 10*3/MM3 (ref 0–0.2)
BASOPHILS NFR BLD AUTO: 1.1 % (ref 0–1.5)
BILIRUB SERPL-MCNC: 0.8 MG/DL (ref 0–1.2)
BUN SERPL-MCNC: 11 MG/DL (ref 6–20)
BUN/CREAT SERPL: 11.3 (ref 7–25)
CALCIUM SPEC-SCNC: 9.7 MG/DL (ref 8.6–10.5)
CHLORIDE SERPL-SCNC: 105 MMOL/L (ref 98–107)
CHOLEST SERPL-MCNC: 193 MG/DL (ref 0–200)
CO2 SERPL-SCNC: 22.7 MMOL/L (ref 22–29)
CREAT SERPL-MCNC: 0.97 MG/DL (ref 0.76–1.27)
DEPRECATED RDW RBC AUTO: 49.1 FL (ref 37–54)
EGFRCR SERPLBLD CKD-EPI 2021: 95.1 ML/MIN/1.73
EOSINOPHIL # BLD AUTO: 0.08 10*3/MM3 (ref 0–0.4)
EOSINOPHIL NFR BLD AUTO: 2.1 % (ref 0.3–6.2)
ERYTHROCYTE [DISTWIDTH] IN BLOOD BY AUTOMATED COUNT: 13.6 % (ref 12.3–15.4)
GLOBULIN UR ELPH-MCNC: 2.5 GM/DL
GLUCOSE SERPL-MCNC: 97 MG/DL (ref 65–99)
HCT VFR BLD AUTO: 47.3 % (ref 37.5–51)
HDLC SERPL-MCNC: 42 MG/DL (ref 40–60)
HGB BLD-MCNC: 17 G/DL (ref 13–17.7)
IMM GRANULOCYTES # BLD AUTO: 0.01 10*3/MM3 (ref 0–0.05)
IMM GRANULOCYTES NFR BLD AUTO: 0.3 % (ref 0–0.5)
LDLC SERPL CALC-MCNC: 122 MG/DL (ref 0–100)
LDLC/HDLC SERPL: 2.81 {RATIO}
LYMPHOCYTES # BLD AUTO: 1.4 10*3/MM3 (ref 0.7–3.1)
LYMPHOCYTES NFR BLD AUTO: 37.1 % (ref 19.6–45.3)
MCH RBC QN AUTO: 35.6 PG (ref 26.6–33)
MCHC RBC AUTO-ENTMCNC: 35.9 G/DL (ref 31.5–35.7)
MCV RBC AUTO: 99 FL (ref 79–97)
MONOCYTES # BLD AUTO: 0.31 10*3/MM3 (ref 0.1–0.9)
MONOCYTES NFR BLD AUTO: 8.2 % (ref 5–12)
NEUTROPHILS NFR BLD AUTO: 1.93 10*3/MM3 (ref 1.7–7)
NEUTROPHILS NFR BLD AUTO: 51.2 % (ref 42.7–76)
NRBC BLD AUTO-RTO: 0 /100 WBC (ref 0–0.2)
PLATELET # BLD AUTO: 154 10*3/MM3 (ref 140–450)
PMV BLD AUTO: 9.8 FL (ref 6–12)
POTASSIUM SERPL-SCNC: 4.4 MMOL/L (ref 3.5–5.2)
PROT SERPL-MCNC: 7 G/DL (ref 6–8.5)
PSA SERPL-MCNC: 1.72 NG/ML (ref 0–4)
RBC # BLD AUTO: 4.78 10*6/MM3 (ref 4.14–5.8)
SODIUM SERPL-SCNC: 141 MMOL/L (ref 136–145)
TRIGL SERPL-MCNC: 165 MG/DL (ref 0–150)
URATE SERPL-MCNC: 4.7 MG/DL (ref 3.4–7)
VLDLC SERPL-MCNC: 29 MG/DL (ref 5–40)
WBC NRBC COR # BLD: 3.77 10*3/MM3 (ref 3.4–10.8)

## 2023-08-14 PROCEDURE — 80053 COMPREHEN METABOLIC PANEL: CPT

## 2023-08-14 PROCEDURE — 85025 COMPLETE CBC W/AUTO DIFF WBC: CPT

## 2023-08-14 PROCEDURE — G0103 PSA SCREENING: HCPCS

## 2023-08-14 PROCEDURE — 99396 PREV VISIT EST AGE 40-64: CPT | Performed by: FAMILY MEDICINE

## 2023-08-14 PROCEDURE — 84550 ASSAY OF BLOOD/URIC ACID: CPT

## 2023-08-14 PROCEDURE — 80061 LIPID PANEL: CPT

## 2023-08-14 RX ORDER — ALLOPURINOL 300 MG/1
300 TABLET ORAL DAILY
Qty: 90 TABLET | Refills: 3 | Status: SHIPPED | OUTPATIENT
Start: 2023-08-14

## 2023-08-14 RX ORDER — ALLOPURINOL 300 MG/1
TABLET ORAL
Qty: 30 TABLET | OUTPATIENT
Start: 2023-08-14

## 2023-08-14 NOTE — PATIENT INSTRUCTIONS

## 2023-08-14 NOTE — PROGRESS NOTES
Deepak Albarran is a 48 y.o. male who presents today to complete annual exam.    Chief Complaint   Patient presents with    Annual Exam        Patient is here for his annual exam. He has been walking 3.3 miles a 3-4 days a week which takes him about 50 minutes. His diet consists of a lot of fatty and greasy foods. He does eat salmon and salad as his healthy meals but not very often. He sees the dentist twice a year. He saw optometrist 2 years ago. He has not seen dermatologist. He has no acute complaints today.        Review of Systems   Constitutional:  Negative for fever and unexpected weight loss.   HENT:  Negative for congestion, ear pain and sore throat.    Eyes:  Negative for visual disturbance.   Respiratory:  Negative for cough, shortness of breath and wheezing.    Cardiovascular:  Negative for chest pain and palpitations.   Gastrointestinal:  Negative for abdominal pain, blood in stool, constipation, diarrhea, nausea, vomiting and GERD.   Endocrine: Negative for polydipsia and polyuria.   Genitourinary:  Negative for difficulty urinating.   Musculoskeletal:  Negative for joint swelling.   Skin:  Negative for rash and skin lesions.   Allergic/Immunologic: Negative for environmental allergies.   Neurological:  Negative for seizures and syncope.   Hematological:  Does not bruise/bleed easily.   Psychiatric/Behavioral:  Negative for suicidal ideas.       PHQ-9 Depression Screening  Little interest or pleasure in doing things? 0-->not at all   Feeling down, depressed, or hopeless? 0-->not at all   Trouble falling or staying asleep, or sleeping too much?     Feeling tired or having little energy?     Poor appetite or overeating?     Feeling bad about yourself - or that you are a failure or have let yourself or your family down?     Trouble concentrating on things, such as reading the newspaper or watching television?     Moving or speaking so slowly that other people could have noticed? Or the opposite - being  so fidgety or restless that you have been moving around a lot more than usual?     Thoughts that you would be better off dead, or of hurting yourself in some way?     PHQ-9 Total Score 0   If you checked off any problems, how difficult have these problems made it for you to do your work, take care of things at home, or get along with other people?         Past Medical History:   Diagnosis Date    Gout     HTN (hypertension)         Past Surgical History:   Procedure Laterality Date    LASIK Bilateral 2004    LIPOMA RESECTION      WISDOM TOOTH EXTRACTION          Family History   Problem Relation Age of Onset    Stroke Mother     Osteoarthritis Father     Hearing loss Father     No Known Problems Sister     Leukemia Maternal Grandmother     Other Maternal Grandfather         unknown    Lung cancer Paternal Grandmother     Other Paternal Grandfather         unknown    Epilepsy Son         Social History     Socioeconomic History    Marital status: Single     Spouse name: N/A    Number of children: 1    Years of education: College    Highest education level: Bachelor's degree (e.g., BA, AB, BS)   Tobacco Use    Smoking status: Former     Packs/day: 1.00     Years: 10.00     Pack years: 10.00     Types: Cigarettes     Start date:      Quit date: 2003     Years since quittin.1    Smokeless tobacco: Current     Types: Snuff   Vaping Use    Vaping Use: Never used   Substance and Sexual Activity    Alcohol use: Yes     Alcohol/week: 24.0 standard drinks     Types: 12 Glasses of wine, 12 Shots of liquor per week    Drug use: No    Sexual activity: Yes     Partners: Female     Birth control/protection: Post-menopausal, Condom     Comment: 2 female sexual partners in the last year        Current Outpatient Medications on File Prior to Visit   Medication Sig Dispense Refill    colchicine (Mitigare) 0.6 MG capsule capsule Take 1 capsule by mouth 2 (Two) Times a Day As Needed (gout flare). 30 capsule 3     "losartan (COZAAR) 100 MG tablet Take 1 tablet by mouth Daily. 90 tablet 3    [DISCONTINUED] allopurinol (ZYLOPRIM) 300 MG tablet Take 1 tablet by mouth Daily. 90 tablet 3    [DISCONTINUED] albuterol sulfate  (90 Base) MCG/ACT inhaler Inhale 2 puffs Every 4 (Four) Hours As Needed for Wheezing or Shortness of Air. 18 g 0     No current facility-administered medications on file prior to visit.       No Known Allergies     Visit Vitals  /88   Pulse 77   Temp 97.8 øF (36.6 øC)   Ht 185.4 cm (73\")   Wt 97.1 kg (214 lb)   SpO2 98%   BMI 28.23 kg/mý      Body mass index is 28.23 kg/mý.    Physical Exam  Constitutional:       General: He is not in acute distress.     Appearance: He is well-developed. He is not diaphoretic.   HENT:      Head: Atraumatic.   Cardiovascular:      Rate and Rhythm: Normal rate and regular rhythm.      Heart sounds: Normal heart sounds. No murmur heard.    No friction rub. No gallop.   Pulmonary:      Effort: Pulmonary effort is normal. No respiratory distress.      Breath sounds: Normal breath sounds. No stridor. No wheezing, rhonchi or rales.   Abdominal:      General: Bowel sounds are normal. There is no distension.      Palpations: Abdomen is soft. There is no mass.      Tenderness: There is no abdominal tenderness. There is no guarding or rebound.      Hernia: No hernia is present.   Musculoskeletal:      Cervical back: Normal range of motion and neck supple.   Skin:     General: Skin is warm and dry.   Neurological:      Mental Status: He is alert and oriented to person, place, and time.   Psychiatric:         Behavior: Behavior normal.        Results for orders placed or performed in visit on 08/11/22   Cologuard - Stool, Per Rectum    Specimen: Per Rectum; Stool   Result Value Ref Range    Cologuard Negative Negative        Problems Addressed this Visit          Cardiac and Vasculature    HTN (hypertension)    Relevant Orders    Comprehensive Metabolic Panel    CBC & " Differential    Lipid Panel       Genitourinary and Reproductive     Prostate cancer screening    Relevant Orders    PSA Screen       Health Encounters    Well adult exam - Primary     The patient is here for health maintenance visit.  Currently, the patient consumes a unhealthy diet and has an adequate exercise regimen.  Screening lab work is ordered.  Immunizations were reviewed today.  Advice and education was given regarding nutrition, aerobic exercise, routine dental evaluations, routine eye exams, reproductive health, cardiovascular risk reduction, sunscreen use, self skin examination (annual dermatology evaluations) and seatbelt use (general overall safety).  Further recommendations will be given if needed after lab evaluation.  Annual wellness evaluation is recommended.           Relevant Orders    Comprehensive Metabolic Panel    CBC & Differential    Lipid Panel    PSA Screen    Uric Acid       Musculoskeletal and Injuries    Gout    Relevant Medications    allopurinol (ZYLOPRIM) 300 MG tablet    Other Relevant Orders    Uric Acid     Diagnoses         Codes Comments    Well adult exam    -  Primary ICD-10-CM: Z00.00  ICD-9-CM: V70.0     Prostate cancer screening     ICD-10-CM: Z12.5  ICD-9-CM: V76.44     Primary hypertension     ICD-10-CM: I10  ICD-9-CM: 401.9     Chronic idiopathic gout involving toe without tophus, unspecified laterality     ICD-10-CM: M1A.0790  ICD-9-CM: 274.02             Return in about 1 year (around 8/14/2024) for Annual.    Nii Child MD  8/14/2023

## 2023-10-13 ENCOUNTER — TELEPHONE (OUTPATIENT)
Dept: FAMILY MEDICINE CLINIC | Facility: CLINIC | Age: 48
End: 2023-10-13
Payer: COMMERCIAL

## 2023-10-13 NOTE — TELEPHONE ENCOUNTER
Hub and front can read      Tried to reach the pt and let him know that the paper work he requested has been filled out and is ready for  with the lady's upfront

## 2024-05-06 ENCOUNTER — OFFICE VISIT (OUTPATIENT)
Dept: FAMILY MEDICINE CLINIC | Facility: CLINIC | Age: 49
End: 2024-05-06
Payer: COMMERCIAL

## 2024-05-06 VITALS
DIASTOLIC BLOOD PRESSURE: 84 MMHG | SYSTOLIC BLOOD PRESSURE: 132 MMHG | HEIGHT: 73 IN | OXYGEN SATURATION: 98 % | TEMPERATURE: 98.4 F | HEART RATE: 96 BPM | WEIGHT: 217.6 LBS | BODY MASS INDEX: 28.84 KG/M2

## 2024-05-06 DIAGNOSIS — J18.9 COMMUNITY ACQUIRED PNEUMONIA OF RIGHT LUNG, UNSPECIFIED PART OF LUNG: Primary | ICD-10-CM

## 2024-05-06 PROCEDURE — 99214 OFFICE O/P EST MOD 30 MIN: CPT | Performed by: FAMILY MEDICINE

## 2024-05-06 RX ORDER — ALBUTEROL SULFATE 90 UG/1
2 AEROSOL, METERED RESPIRATORY (INHALATION) EVERY 4 HOURS PRN
Qty: 18 G | Refills: 1 | Status: SHIPPED | OUTPATIENT
Start: 2024-05-06

## 2024-05-06 RX ORDER — PREDNISONE 20 MG/1
40 TABLET ORAL DAILY
Qty: 10 TABLET | Refills: 0 | Status: SHIPPED | OUTPATIENT
Start: 2024-05-06

## 2024-05-06 RX ORDER — CEFDINIR 300 MG/1
300 CAPSULE ORAL 2 TIMES DAILY
Qty: 20 CAPSULE | Refills: 0 | Status: SHIPPED | OUTPATIENT
Start: 2024-05-06

## 2024-05-15 ENCOUNTER — TELEPHONE (OUTPATIENT)
Dept: FAMILY MEDICINE CLINIC | Facility: CLINIC | Age: 49
End: 2024-05-15
Payer: COMMERCIAL

## 2024-05-15 NOTE — TELEPHONE ENCOUNTER
Caller: Deepak Albarran     Relationship: SELF    Best call back number: [phone]     What is your medical concern? WHEEZING, HEADACHES, BLISTER ON TONGUE, CONSTANT NASAL DRAINAGE, NON PRODUCTIVE COUGH    How long has this issue been going on? SINCE LAST VISIT    Is your provider already aware of this issue? YES    Have you been treated for this issue? YES    HE WAS TOLD TO CALL BACK IF THE SYMPTOMS WERE NOT GETTING BETTER.    HE HAS BEEN THROUGH TWO ROUNDS OF ANTIBIOTIC AND STEROIDS    PLEASE CALL AND ADVISE

## 2024-05-17 ENCOUNTER — TELEPHONE (OUTPATIENT)
Dept: FAMILY MEDICINE CLINIC | Facility: CLINIC | Age: 49
End: 2024-05-17
Payer: COMMERCIAL

## 2024-05-17 ENCOUNTER — OFFICE VISIT (OUTPATIENT)
Dept: FAMILY MEDICINE CLINIC | Facility: CLINIC | Age: 49
End: 2024-05-17
Payer: COMMERCIAL

## 2024-05-17 VITALS
OXYGEN SATURATION: 97 % | HEART RATE: 76 BPM | BODY MASS INDEX: 28.47 KG/M2 | WEIGHT: 214.8 LBS | SYSTOLIC BLOOD PRESSURE: 131 MMHG | HEIGHT: 73 IN | DIASTOLIC BLOOD PRESSURE: 82 MMHG | TEMPERATURE: 98 F

## 2024-05-17 DIAGNOSIS — J02.9 SORE THROAT: ICD-10-CM

## 2024-05-17 DIAGNOSIS — J01.40 ACUTE NON-RECURRENT PANSINUSITIS: Primary | ICD-10-CM

## 2024-05-17 DIAGNOSIS — R05.9 COUGH, UNSPECIFIED TYPE: ICD-10-CM

## 2024-05-17 DIAGNOSIS — J40 BRONCHITIS: ICD-10-CM

## 2024-05-17 LAB
EXPIRATION DATE: NORMAL
EXPIRATION DATE: NORMAL
INTERNAL CONTROL: NORMAL
INTERNAL CONTROL: NORMAL
Lab: NORMAL
Lab: NORMAL
S PYO AG THROAT QL: NEGATIVE
SARS-COV-2 AG UPPER RESP QL IA.RAPID: NOT DETECTED

## 2024-05-17 RX ORDER — DOXYCYCLINE 100 MG/1
100 CAPSULE ORAL 2 TIMES DAILY
Qty: 20 CAPSULE | Refills: 0 | Status: SHIPPED | OUTPATIENT
Start: 2024-05-17 | End: 2024-05-27

## 2024-05-17 RX ORDER — DEXTROMETHORPHAN HYDROBROMIDE AND PROMETHAZINE HYDROCHLORIDE 15; 6.25 MG/5ML; MG/5ML
5 SYRUP ORAL NIGHTLY PRN
Qty: 118 ML | Refills: 0 | Status: SHIPPED | OUTPATIENT
Start: 2024-05-17

## 2024-05-17 RX ORDER — METHYLPREDNISOLONE ACETATE 40 MG/ML
40 INJECTION, SUSPENSION INTRA-ARTICULAR; INTRALESIONAL; INTRAMUSCULAR; SOFT TISSUE ONCE
Status: COMPLETED | OUTPATIENT
Start: 2024-05-17 | End: 2024-05-17

## 2024-05-17 RX ADMIN — METHYLPREDNISOLONE ACETATE 40 MG: 40 INJECTION, SUSPENSION INTRA-ARTICULAR; INTRALESIONAL; INTRAMUSCULAR; SOFT TISSUE at 09:55

## 2024-05-17 NOTE — TELEPHONE ENCOUNTER
Pharmacy Name:  [facility]     Pharmacy representative phone number:[phone]    What medication are you calling in regards to: albuterol sulfate  (90 Base) MCG/ACT inhaler     What question does the pharmacy have: Sig: Inhale 2 puffs 1 (One) Time As Needed (shortness of breath or wheezind) for up to 1 dose. Maximum 12 puffs/day.  PHARMACY NEEDS CLARIFICATIONS ON DOSAGE    Who is the provider that prescribed the medication: ML WESLEY    Additional notes: PLEASE CALL WITH CLARIFICATIONS ON DOSAGE

## 2024-05-17 NOTE — PROGRESS NOTES
Follow Up Office Note   Patient Name: Deepak Albarran  : 1975   MRN: [id number]     Chief Complaint:    Chief Complaint   Patient presents with    Cough     Per patient he has a cough with wheezing x 1 month; states he was seen about 10 days ago and was given steroids and an abx.     Headache    Sore Throat     Per patient he has had a sore throat off and on for about a month.        History of Present Illness:   Deepak Albarran is a 48 y.o. male who presents today with complaint of sinus congestion and postnasal drainage times approximately 2 weeks.  Earlier this month patient was diagnosed with community-acquired pneumonia of right lung and was placed on a course of cefdinir and prednisone along with albuterol inhaler.  Patient reports that he is continuing to experience chest congestion, wheezing and frequent cough.  He states the cough is primarily nonproductive, but he will occasionally cough up some yellow sputum.  Patient also complaining of sore throat which has been waxing and waning for the past month.        Subjective   I have reviewed and the following portions of the patient's history were updated as appropriate: past family history, past medical history, past social history, past surgical history and problem list.    Review of Systems:   Review of Systems   Constitutional:  Negative for chills, diaphoresis and fever.   HENT:  Positive for congestion, postnasal drip, sinus pressure, sinus pain and sore throat. Negative for ear pain and trouble swallowing.    Respiratory:  Positive for cough, chest tightness and wheezing. Negative for shortness of breath and stridor.    Cardiovascular: Negative.    Gastrointestinal: Negative.    Musculoskeletal:  Negative for myalgias.   Neurological:  Positive for headaches. Negative for dizziness.        Past Medical History:   Past Medical History:   Diagnosis Date    Gout     HTN (hypertension)        Medications:     Current Outpatient Medications:  "    albuterol sulfate  (90 Base) MCG/ACT inhaler, Inhale 2 puffs Every 4 (Four) Hours As Needed for Wheezing., Disp: 18 g, Rfl: 1    allopurinol (ZYLOPRIM) 300 MG tablet, Take 1 tablet by mouth Daily., Disp: 90 tablet, Rfl: 3    colchicine (Mitigare) 0.6 MG capsule capsule, Take 1 capsule by mouth 2 (Two) Times a Day As Needed (gout flare)., Disp: 30 capsule, Rfl: 3    losartan (COZAAR) 100 MG tablet, Take 1 tablet by mouth Daily., Disp: 90 tablet, Rfl: 3    Albuterol-Budesonide 90-80 MCG/ACT aerosol, Inhale 2 puffs 1 (One) Time As Needed (shortness of breath or wheezind) for up to 1 dose. Maximum 12 puffs/day., Disp: 10.7 g, Rfl: 0    doxycycline (MONODOX) 100 MG capsule, Take 1 capsule by mouth 2 (Two) Times a Day for 10 days., Disp: 20 capsule, Rfl: 0    promethazine-dextromethorphan (PROMETHAZINE-DM) 6.25-15 MG/5ML syrup, Take 5 mL by mouth At Night As Needed for Cough., Disp: 118 mL, Rfl: 0  No current facility-administered medications for this visit.    Allergies:   No Known Allergies      Objective   Physical Exam:  Vital Signs:   Vitals:    05/17/24 0847   BP: 131/82   Pulse: 76   Temp: 98 °F (36.7 °C)   TempSrc: Infrared   SpO2: 97%   Weight: 97.4 kg (214 lb 12.8 oz)   Height: 185.4 cm (72.99\")     Body mass index is 28.35 kg/m².     Physical Exam  Vitals and nursing note reviewed.   Constitutional:       General: He is not in acute distress.     Appearance: Normal appearance. He is well-developed. He is not ill-appearing, toxic-appearing or diaphoretic.   HENT:      Head: Normocephalic and atraumatic.      Right Ear: External ear normal. A middle ear effusion is present. Tympanic membrane is injected and bulging.      Left Ear: External ear normal. A middle ear effusion is present. Tympanic membrane is injected and bulging.      Nose: Mucosal edema and congestion present.      Right Turbinates: Swollen.      Left Turbinates: Swollen.      Right Sinus: Maxillary sinus tenderness present.      Left " Sinus: Maxillary sinus tenderness present.      Mouth/Throat:      Lips: Pink.      Mouth: Mucous membranes are moist.      Pharynx: Uvula midline. Posterior oropharyngeal erythema (with cobblestoning) present.   Cardiovascular:      Rate and Rhythm: Normal rate and regular rhythm.      Heart sounds: Normal heart sounds.   Pulmonary:      Effort: Pulmonary effort is normal. No tachypnea or respiratory distress.      Breath sounds: No stridor. Wheezing present. No decreased breath sounds, rhonchi or rales.   Musculoskeletal:      Cervical back: Normal range of motion and neck supple. No rigidity. No muscular tenderness.   Lymphadenopathy:      Cervical: No cervical adenopathy.   Skin:     General: Skin is warm and dry.   Neurological:      General: No focal deficit present.      Mental Status: He is alert and oriented to person, place, and time. Mental status is at baseline.   Psychiatric:         Mood and Affect: Mood normal.         Behavior: Behavior normal. Behavior is cooperative.         Thought Content: Thought content normal.         Judgment: Judgment normal.         Assessment / Plan    Assessment/Plan:   Diagnoses and all orders for this visit:    1. Acute non-recurrent pansinusitis (Primary)  Assessment & Plan:  Acute problem which has failed to respond to antibiotic therapy with cefdinir.  Plan to prescribe course of doxycycline.  Patient tested negative for strep and COVID-19.    Orders:  -     doxycycline (MONODOX) 100 MG capsule; Take 1 capsule by mouth 2 (Two) Times a Day for 10 days.  Dispense: 20 capsule; Refill: 0    2. Bronchitis  Assessment & Plan:  Acute problem.  Depo-Medrol injection in office today.  Plan to begin trial of AirSupra.  Promethazine DM for nighttime cough.  Should symptoms fail to improve with medication therapy recommend repeat chest x-ray.    Orders:  -     methylPREDNISolone acetate (DEPO-medrol) injection 40 mg  -     Albuterol-Budesonide 90-80 MCG/ACT aerosol; Inhale 2  puffs 1 (One) Time As Needed (shortness of breath or wheezind) for up to 1 dose. Maximum 12 puffs/day.  Dispense: 10.7 g; Refill: 0    3. Cough, unspecified type  -     POCT SARS-CoV-2 Antigen  -     promethazine-dextromethorphan (PROMETHAZINE-DM) 6.25-15 MG/5ML syrup; Take 5 mL by mouth At Night As Needed for Cough.  Dispense: 118 mL; Refill: 0    4. Sore throat  -     POC Rapid Strep A           Discussed the nature of the medical condition(s) risks, complications, implications, management, safe and proper use of medications. Encouraged medication compliance, and keeping scheduled follow up appointments with me and any other providers.      RTC if symptoms fail to improve, to ER if symptoms worsen.      *Dictated Utilizing Dragon Dictation   Please note that portions of this note were completed with a voice recognition program.   Part of this note may be an electronic transcription/translation of spoken language to printed text using the Dragon Dictation System. Spelling and/or grammatical errors may exist despite efforts at proofreading.      NOTE TO PATIENT: The 21st Century Cures Act makes medical notes like these available to patients in the interest of transparency. However, be advised this is a medical document. It is intended as peer to peer communication. It is written in medical language and may contain abbreviations or verbiage that are unfamiliar. It may appear blunt or direct. Medical documents are intended to carry relevant information, facts as evident, and the clinical opinion of the practitioner.      IRIS Mulligan  [facility]

## 2024-05-18 PROBLEM — J01.40 ACUTE NON-RECURRENT PANSINUSITIS: Status: ACTIVE | Noted: 2024-05-18

## 2024-05-18 PROBLEM — J40 BRONCHITIS: Status: ACTIVE | Noted: 2024-05-18

## 2024-05-18 PROBLEM — B34.9 ACUTE VIRAL SYNDROME: Status: RESOLVED | Noted: 2022-04-28 | Resolved: 2024-05-18

## 2024-05-18 NOTE — ASSESSMENT & PLAN NOTE
Acute problem.  Depo-Medrol injection in office today.  Plan to begin trial of AirSupra.  Promethazine DM for nighttime cough.  Should symptoms fail to improve with medication therapy recommend repeat chest x-ray.

## 2024-05-20 ENCOUNTER — TELEPHONE (OUTPATIENT)
Dept: FAMILY MEDICINE CLINIC | Facility: CLINIC | Age: 49
End: 2024-05-20
Payer: COMMERCIAL

## 2024-06-26 DIAGNOSIS — J40 BRONCHITIS: ICD-10-CM

## 2024-06-26 NOTE — TELEPHONE ENCOUNTER
Rx Refill Note  Requested Prescriptions     Pending Prescriptions Disp Refills    Albuterol-Budesonide 90-80 MCG/ACT aerosol 10.7 g 0     Sig: Inhale 2 puffs 1 (One) Time As Needed (shortness of breath or wheezind) for up to 1 dose. Maximum 12 puffs/day.      Last office visit with prescribing clinician: 8/14/2023   Last telemedicine visit with prescribing clinician: Visit date not found   Next office visit with prescribing clinician: 8/15/2024                         Would you like a call back once the refill request has been completed: [] Yes [] No    If the office needs to give you a call back, can they leave a voicemail: [] Yes [] No    Kathi Scott MA  06/26/24, 11:49 EDT

## 2024-07-12 ENCOUNTER — TELEPHONE (OUTPATIENT)
Dept: FAMILY MEDICINE CLINIC | Facility: CLINIC | Age: 49
End: 2024-07-12

## 2024-07-12 NOTE — TELEPHONE ENCOUNTER
Caller: Deepak Albarran    Relationship to patient: Self    Best call back number: [phone]    Type of visit: PHYSICAL    Requested date: IN AUGUST     If rescheduling, when is the original appointment: 8/15/24 WITH DR. BALLESTEROS    Additional notes: PATIENT IS SCHEDULED WITH SHARRON ONEAL PA-C  8/16/24 @ 9:45 AM - PATIENT HAD TO RESCHEDULE HIS APPOINTMENT WITH PCP DUE TO WORK, NEXT APPOINTMENT WITH PCP WAS IN JANUARY 2025

## 2024-08-16 ENCOUNTER — OFFICE VISIT (OUTPATIENT)
Dept: FAMILY MEDICINE CLINIC | Facility: CLINIC | Age: 49
End: 2024-08-16
Payer: COMMERCIAL

## 2024-08-16 ENCOUNTER — LAB (OUTPATIENT)
Dept: LAB | Facility: HOSPITAL | Age: 49
End: 2024-08-16
Payer: COMMERCIAL

## 2024-08-16 VITALS
HEART RATE: 95 BPM | SYSTOLIC BLOOD PRESSURE: 154 MMHG | BODY MASS INDEX: 28.02 KG/M2 | OXYGEN SATURATION: 95 % | WEIGHT: 211.4 LBS | TEMPERATURE: 98.6 F | RESPIRATION RATE: 18 BRPM | HEIGHT: 73 IN | DIASTOLIC BLOOD PRESSURE: 96 MMHG

## 2024-08-16 DIAGNOSIS — Z78.9 ALCOHOL USE: ICD-10-CM

## 2024-08-16 DIAGNOSIS — Z00.00 ENCOUNTER FOR ANNUAL HEALTH EXAMINATION: Primary | ICD-10-CM

## 2024-08-16 DIAGNOSIS — Z12.5 PROSTATE CANCER SCREENING: ICD-10-CM

## 2024-08-16 DIAGNOSIS — I10 PRIMARY HYPERTENSION: ICD-10-CM

## 2024-08-16 DIAGNOSIS — R74.8 ELEVATED LIVER ENZYMES: ICD-10-CM

## 2024-08-16 PROBLEM — F10.90 ALCOHOL USE: Status: ACTIVE | Noted: 2024-08-16

## 2024-08-16 LAB
ALBUMIN SERPL-MCNC: 4.7 G/DL (ref 3.5–5.2)
ALBUMIN/GLOB SERPL: 1.7 G/DL
ALP SERPL-CCNC: 100 U/L (ref 39–117)
ALT SERPL W P-5'-P-CCNC: 53 U/L (ref 1–41)
ANION GAP SERPL CALCULATED.3IONS-SCNC: 11.1 MMOL/L (ref 5–15)
AST SERPL-CCNC: 34 U/L (ref 1–40)
BASOPHILS # BLD AUTO: 0.02 10*3/MM3 (ref 0–0.2)
BASOPHILS NFR BLD AUTO: 0.4 % (ref 0–1.5)
BILIRUB SERPL-MCNC: 0.8 MG/DL (ref 0–1.2)
BUN SERPL-MCNC: 11 MG/DL (ref 6–20)
BUN/CREAT SERPL: 10.9 (ref 7–25)
CALCIUM SPEC-SCNC: 10.1 MG/DL (ref 8.6–10.5)
CHLORIDE SERPL-SCNC: 103 MMOL/L (ref 98–107)
CHOLEST SERPL-MCNC: 190 MG/DL (ref 0–200)
CO2 SERPL-SCNC: 22.9 MMOL/L (ref 22–29)
CREAT SERPL-MCNC: 1.01 MG/DL (ref 0.76–1.27)
DEPRECATED RDW RBC AUTO: 45 FL (ref 37–54)
EGFRCR SERPLBLD CKD-EPI 2021: 90 ML/MIN/1.73
EOSINOPHIL # BLD AUTO: 0.07 10*3/MM3 (ref 0–0.4)
EOSINOPHIL NFR BLD AUTO: 1.5 % (ref 0.3–6.2)
ERYTHROCYTE [DISTWIDTH] IN BLOOD BY AUTOMATED COUNT: 12.6 % (ref 12.3–15.4)
GLOBULIN UR ELPH-MCNC: 2.7 GM/DL
GLUCOSE SERPL-MCNC: 100 MG/DL (ref 65–99)
HCT VFR BLD AUTO: 51.1 % (ref 37.5–51)
HDLC SERPL-MCNC: 39 MG/DL (ref 40–60)
HGB BLD-MCNC: 18 G/DL (ref 13–17.7)
IMM GRANULOCYTES # BLD AUTO: 0.01 10*3/MM3 (ref 0–0.05)
IMM GRANULOCYTES NFR BLD AUTO: 0.2 % (ref 0–0.5)
LDLC SERPL CALC-MCNC: 123 MG/DL (ref 0–100)
LDLC/HDLC SERPL: 3.07 {RATIO}
LYMPHOCYTES # BLD AUTO: 1.36 10*3/MM3 (ref 0.7–3.1)
LYMPHOCYTES NFR BLD AUTO: 29.6 % (ref 19.6–45.3)
MCH RBC QN AUTO: 34.6 PG (ref 26.6–33)
MCHC RBC AUTO-ENTMCNC: 35.2 G/DL (ref 31.5–35.7)
MCV RBC AUTO: 98.3 FL (ref 79–97)
MONOCYTES # BLD AUTO: 0.38 10*3/MM3 (ref 0.1–0.9)
MONOCYTES NFR BLD AUTO: 8.3 % (ref 5–12)
NEUTROPHILS NFR BLD AUTO: 2.75 10*3/MM3 (ref 1.7–7)
NEUTROPHILS NFR BLD AUTO: 60 % (ref 42.7–76)
NRBC BLD AUTO-RTO: 0 /100 WBC (ref 0–0.2)
PLATELET # BLD AUTO: 177 10*3/MM3 (ref 140–450)
PMV BLD AUTO: 9.5 FL (ref 6–12)
POTASSIUM SERPL-SCNC: 4.6 MMOL/L (ref 3.5–5.2)
PROT SERPL-MCNC: 7.4 G/DL (ref 6–8.5)
PSA SERPL-MCNC: 0.91 NG/ML (ref 0–4)
RBC # BLD AUTO: 5.2 10*6/MM3 (ref 4.14–5.8)
SODIUM SERPL-SCNC: 137 MMOL/L (ref 136–145)
TRIGL SERPL-MCNC: 156 MG/DL (ref 0–150)
VLDLC SERPL-MCNC: 28 MG/DL (ref 5–40)
WBC NRBC COR # BLD AUTO: 4.59 10*3/MM3 (ref 3.4–10.8)

## 2024-08-16 PROCEDURE — 80061 LIPID PANEL: CPT | Performed by: PHYSICIAN ASSISTANT

## 2024-08-16 PROCEDURE — 80053 COMPREHEN METABOLIC PANEL: CPT | Performed by: PHYSICIAN ASSISTANT

## 2024-08-16 PROCEDURE — G0103 PSA SCREENING: HCPCS | Performed by: PHYSICIAN ASSISTANT

## 2024-08-16 PROCEDURE — 85025 COMPLETE CBC W/AUTO DIFF WBC: CPT | Performed by: PHYSICIAN ASSISTANT

## 2024-08-16 NOTE — ASSESSMENT & PLAN NOTE
BP poorly controlled today, though patient states he drank heavily on Wednesday and last night.  He does not check blood pressure at home.  Repeat BP in office today 132/90.  Discussed necessary diet and lifestyle modifications  Will continue losartan 100 mg-patient will follow-up in 4 weeks encouraged to monitor BP at home in the meantime  If blood pressure continues to be elevated, patient will likely benefit from additional BP meds

## 2024-08-16 NOTE — PATIENT INSTRUCTIONS
Managing Your Hypertension  Hypertension, also called high blood pressure, is when the force of the blood pressing against the walls of the arteries is too strong. Arteries are blood vessels that carry blood from your heart throughout your body. Hypertension forces the heart to work harder to pump blood and may cause the arteries to become narrow or stiff.  Understanding blood pressure readings  A blood pressure reading includes a higher number over a lower number:  The first, or top, number is called the systolic pressure. It is a measure of the pressure in your arteries as your heart beats.  The second, or bottom number, is called the diastolic pressure. It is a measure of the pressure in your arteries as the heart relaxes.  For most people, a normal blood pressure is below 120/80. Your personal target blood pressure may vary depending on your medical conditions, your age, and other factors.  Blood pressure is classified into four stages. Based on your blood pressure reading, your health care provider may use the following stages to determine what type of treatment you need, if any. Systolic pressure and diastolic pressure are measured in a unit called millimeters of mercury (mmHg).  Normal  Systolic pressure: below 120.  Diastolic pressure: below 80.  Elevated  Systolic pressure: 120-129.  Diastolic pressure: below 80.  Hypertension stage 1  Systolic pressure: 130-139.  Diastolic pressure: 80-89.  Hypertension stage 2  Systolic pressure: 140 or above.  Diastolic pressure: 90 or above.  How can this condition affect me?  Managing your hypertension is very important. Over time, hypertension can damage the arteries and decrease blood flow to parts of the body, including the brain, heart, and kidneys. Having untreated or uncontrolled hypertension can lead to:  A heart attack.  A stroke.  A weakened blood vessel (aneurysm).  Heart failure.  Kidney damage.  Eye damage.  Memory and concentration problems.  Vascular  dementia.  What actions can I take to manage this condition?  Hypertension can be managed by making lifestyle changes and possibly by taking medicines. Your health care provider will help you make a plan to bring your blood pressure within a normal range. You may be referred for counseling on a healthy diet and physical activity.  Nutrition    Eat a diet that is high in fiber and potassium, and low in salt (sodium), added sugar, and fat. An example eating plan is called the DASH diet. DASH stands for Dietary Approaches to Stop Hypertension. To eat this way:  Eat plenty of fresh fruits and vegetables. Try to fill one-half of your plate at each meal with fruits and vegetables.  Eat whole grains, such as whole-wheat pasta, brown rice, or whole-grain bread. Fill about one-fourth of your plate with whole grains.  Eat low-fat dairy products.  Avoid fatty cuts of meat, processed or cured meats, and poultry with skin. Fill about one-fourth of your plate with lean proteins such as fish, chicken without skin, beans, eggs, and tofu.  Avoid pre-made and processed foods. These tend to be higher in sodium, added sugar, and fat.  Reduce your daily sodium intake. Many people with hypertension should eat less than 1,500 mg of sodium a day.  Lifestyle    Work with your health care provider to maintain a healthy body weight or to lose weight. Ask what an ideal weight is for you.  Get at least 30 minutes of exercise that causes your heart to beat faster (aerobic exercise) most days of the week. Activities may include walking, swimming, or biking.  Include exercise to strengthen your muscles (resistance exercise), such as weight lifting, as part of your weekly exercise routine. Try to do these types of exercises for 30 minutes at least 3 days a week.  Do not use any products that contain nicotine or tobacco. These products include cigarettes, chewing tobacco, and vaping devices, such as e-cigarettes. If you need help quitting, ask your  health care provider.  Control any long-term (chronic) conditions you have, such as high cholesterol or diabetes.  Identify your sources of stress and find ways to manage stress. This may include meditation, deep breathing, or making time for fun activities.  Alcohol use  Do not drink alcohol if:  Your health care provider tells you not to drink.  You are pregnant, may be pregnant, or are planning to become pregnant.  If you drink alcohol:  Limit how much you have to:  0-1 drink a day for women.  0-2 drinks a day for men.  Know how much alcohol is in your drink. In the [place] one drink equals one 12 oz bottle of beer (355 mL), one 5 oz glass of wine (148 mL), or one 1½ oz glass of hard liquor (44 mL).  Medicines  Your health care provider may prescribe medicine if lifestyle changes are not enough to get your blood pressure under control and if:  Your systolic blood pressure is 130 or higher.  Your diastolic blood pressure is 80 or higher.  Take medicines only as told by your health care provider. Follow the directions carefully. Blood pressure medicines must be taken as told by your health care provider. The medicine does not work as well when you skip doses. Skipping doses also puts you at risk for problems.  Monitoring  Before you monitor your blood pressure:  Do not smoke, drink caffeinated beverages, or exercise within 30 minutes before taking a measurement.  Use the bathroom and empty your bladder (urinate).  Sit quietly for at least 5 minutes before taking measurements.  Monitor your blood pressure at home as told by your health care provider. To do this:  Sit with your back straight and supported.  Place your feet flat on the floor. Do not cross your legs.  Support your arm on a flat surface, such as a table. Make sure your upper arm is at heart level.  Each time you measure, take two or three readings one minute apart and record the results.  You may also need to have your blood pressure checked regularly by  your health care provider.  General information  Talk with your health care provider about your diet, exercise habits, and other lifestyle factors that may be contributing to hypertension.  Review all the medicines you take with your health care provider because there may be side effects or interactions.  Keep all follow-up visits. Your health care provider can help you create and adjust your plan for managing your high blood pressure.  Where to find more information  National Heart, Lung, and Blood [place]: www.nhlbi.nih.gov  American Heart Association: www.heart.org  Contact a health care provider if:  You think you are having a reaction to medicines you have taken.  You have repeated (recurrent) headaches.  You feel dizzy.  You have swelling in your ankles.  You have trouble with your vision.  Get help right away if:  You develop a severe headache or confusion.  You have unusual weakness or numbness, or you feel faint.  You have severe pain in your chest or abdomen.  You vomit repeatedly.  You have trouble breathing.  These symptoms may be an emergency. Get help right away. Call 911.  Do not wait to see if the symptoms will go away.  Do not drive yourself to the hospital.  Summary  Hypertension is when the force of blood pumping through your arteries is too strong. If this condition is not controlled, it may put you at risk for serious complications.  Your personal target blood pressure may vary depending on your medical conditions, your age, and other factors. For most people, a normal blood pressure is less than 120/80.  Hypertension is managed by lifestyle changes, medicines, or both.  Lifestyle changes to help manage hypertension include losing weight, eating a healthy, low-sodium diet, exercising more, stopping smoking, and limiting alcohol.  This information is not intended to replace advice given to you by your health care provider. Make sure you discuss any questions you have with your health care  "provider.  Document Revised: 09/01/2022 Document Reviewed: 09/01/2022  [employer] Patient Education © 2024 [employer] Healthy Eating, Adult  Healthy eating may help you get and keep a healthy body weight, reduce the risk of chronic disease, and live a long and productive life. It is important to follow a healthy eating pattern. Your nutritional and calorie needs should be met mainly by different nutrient-rich foods.  What are tips for following this plan?  Reading food labels  Read labels and choose the following:  Reduced or low sodium products.  Juices with 100% fruit juice.  Foods with low saturated fats (<3 g per serving) and high polyunsaturated and monounsaturated fats.  Foods with whole grains, such as whole wheat, cracked wheat, brown rice, and wild rice.  Whole grains that are fortified with folic acid. This is recommended for females who are pregnant or who want to become pregnant.  Read labels and do not eat or drink the following:  Foods or drinks with added sugars. These include foods that contain brown sugar, corn sweetener, corn syrup, dextrose, fructose, glucose, high-fructose corn syrup, honey, invert sugar, lactose, malt syrup, maltose, molasses, raw sugar, sucrose, trehalose, or turbinado sugar.  Limit your intake of added sugars to less than 10% of your total daily calories. Do not eat more than the following amounts of added sugar per day:  6 teaspoons (25 g) for females.  9 teaspoons (38 g) for males.  Foods that contain processed or refined starches and grains.  Refined grain products, such as white flour, degermed cornmeal, white bread, and white rice.  Shopping  Choose nutrient-rich snacks, such as vegetables, whole fruits, and nuts. Avoid high-calorie and high-sugar snacks, such as potato chips, fruit snacks, and candy.  Use oil-based dressings and spreads on foods instead of solid fats such as butter, margarine, sour cream, or cream cheese.  Limit pre-made sauces, mixes, and \"instant\" " products such as flavored rice, instant noodles, and ready-made pasta.  Try more plant-protein sources, such as tofu, tempeh, black beans, edamame, lentils, nuts, and seeds.  Explore eating plans such as the Mediterranean diet or vegetarian diet.  Try heart-healthy dips made with beans and healthy fats like hummus and guacamole. Vegetables go great with these.  Cooking  Use oil to sauté or stir-calabrese foods instead of solid fats such as butter, margarine, or lard.  Try baking, boiling, grilling, or broiling instead of frying.  Remove the fatty part of meats before cooking.  Steam vegetables in water or broth.  Meal planning    At meals, imagine dividing your plate into fourths:  One-half of your plate is fruits and vegetables.  One-fourth of your plate is whole grains.  One-fourth of your plate is protein, especially lean meats, poultry, eggs, tofu, beans, or nuts.  Include low-fat dairy as part of your daily diet.  Lifestyle  Choose healthy options in all settings, including home, work, school, restaurants, or stores.  Prepare your food safely:  Wash your hands after handling raw meats.  Where you prepare food, keep surfaces clean by regularly washing with hot, soapy water.  Keep raw meats separate from ready-to-eat foods, such as fruits and vegetables.  , meat, poultry, and eggs to the recommended temperature. Get a food thermometer.  Store foods at safe temperatures. In general:  Keep cold foods at 40°F (4.4°C) or below.  Keep hot foods at 140°F (60°C) or above.  Keep your freezer at 0°F (-17.8°C) or below.  Foods are not safe to eat if they have been between the temperatures of °F (4.4-60°C) for more than 2 hours.  What foods should I eat?  Fruits  Aim to eat 1½-2½ cups of fresh, canned (in natural juice), or frozen fruits each day. One cup of fruit equals 1 small apple, 1 large banana, 8 large strawberries, 1 cup (237 g) canned fruit, ½ cup (82 g) dried fruit, or 1 cup (240 mL) 100%  juice.  Vegetables  Aim to eat 2-4 cups of fresh and frozen vegetables each day, including different varieties and colors. One cup of vegetables equals 1 cup (91 g) broccoli or cauliflower florets, 2 medium carrots, 2 cups (150 g) raw, leafy greens, 1 large tomato, 1 large be[place] pepper, 1 large sweet potato, or 1 medium white potato.  Grains  Aim to eat 5-10 ounce-equivalents of whole grains each day. Examples of 1 ounce-equivalent of grains include 1 slice of bread, 1 cup (40 g) ready-to-eat cereal, 3 cups (24 g) popcorn, or ½ cup (93 g) cooked rice.  Meats and other proteins  Try to eat 5-7 ounce-equivalents of protein each day. Examples of 1 ounce-equivalent of protein include 1 egg, ½ oz nuts (12 almonds, 24 pistachios, or 7 walnut halves), 1/4 cup (90 g) cooked beans, 6 tablespoons (90 g) hummus or 1 tablespoon (16 g) peanut butter. A cut of meat or fish that is the size of a deck of cards is about 3-4 ounce-equivalents (85 g).  Of the protein you eat each week, try to have at least 8 sounce (227 g) of seafood. This is about 2 servings per week. This includes salmon, trout, herring, sardines, and anchovies.  Dairy  Aim to eat 3 cup-equivalents of fat-free or low-fat dairy each day. Examples of 1 cup-equivalent of dairy include 1 cup (240 mL) milk, 8 ounces (250 g) yogurt, 1½ ounces (44 g) natural cheese, or 1 cup (240 mL) fortified soy milk.  Fats and oils  Aim for about 5 teaspoons (21 g) of fats and oils per day. Choose monounsaturated fats, such as canola and olive oils, mayonnaise made with olive oil or avocado oil, avocados, peanut butter, and most nuts, or polyunsaturated fats, such as sunflower, corn, and soybean oils, walnuts, pine nuts, sesame seeds, sunflower seeds, and flaxseed.  Beverages  Aim for 6 eight-ounce glasses of water per day. Limit coffee to 3-5 eight-ounce cups per day.  Limit caffeinated beverages that have added calories, such as soda and energy drinks.  If you drink alcohol:  Limit how  much you have to:  0-1 drink a day if you are female.  0-2 drinks a day if you are male.  Know how much alcohol is in your drink. In the [place] one drink is one 12 oz bottle of beer (355 mL), one 5 oz glass of wine (148 mL), or one 1½ oz glass of hard liquor (44 mL).  Seasoning and other foods  Try not to add too much salt to your food. Try using herbs and spices instead of salt.  Try not to add sugar to food.  This information is based on [place] nutrition guidelines. To learn more, visit [employer].gov. Exact amounts may vary. You may need different amounts.  This information is not intended to replace advice given to you by your health care provider. Make sure you discuss any questions you have with your health care provider.  Document Revised: 09/18/2023 Document Reviewed: 09/18/2023  Elsevier Patient Education © 2024 [employer]

## 2024-08-16 NOTE — PROGRESS NOTES
Male Physical Note      Patient Name: Deepak Albarran  : 1975   MRN: [id number]     Chief Complaint:  No chief complaint on file.      History of Present Illness: Deepak Albarran is a 49 y.o. male who is here today for their annual health maintenance and physical.    Chronic medical conditions include hypertension, gout, elevated liver enzymes, and heavy alcohol use.  States he eats relatively poorly, though he states he does enjoy seafood/salmon frequently.  States he maintains a relatively active lifestyle.  Denies concerns regarding mood.  Denies any depression, anxiety, thoughts of self-harm.  Continues to drink heavily, stating he drinks at least 2 beers and 4-5 shots of Ayana a night.  Patient denies any acute complaints or concerns today.  Denies chest pain, shortness of breath, palpitations, dizziness, lower extremity edema, abdominal pain, nausea, vomiting, diarrhea.        Subjective      Past Medical History, Social History, Family History and Care Team were all reviewed with patient and updated as appropriate.     Medications:     Current Outpatient Medications:     allopurinol (ZYLOPRIM) 300 MG tablet, Take 1 tablet by mouth Daily., Disp: 90 tablet, Rfl: 3    colchicine (Mitigare) 0.6 MG capsule capsule, Take 1 capsule by mouth 2 (Two) Times a Day As Needed (gout flare)., Disp: 30 capsule, Rfl: 3    losartan (COZAAR) 100 MG tablet, Take 1 tablet by mouth Daily., Disp: 90 tablet, Rfl: 3    albuterol sulfate  (90 Base) MCG/ACT inhaler, Inhale 2 puffs Every 4 (Four) Hours As Needed for Wheezing., Disp: 18 g, Rfl: 1    Albuterol-Budesonide 90-80 MCG/ACT aerosol, Inhale 2 puffs 1 (One) Time As Needed (shortness of breath or wheezind) for up to 1 dose. Maximum 12 puffs/day., Disp: 10.7 g, Rfl: 0    promethazine-dextromethorphan (PROMETHAZINE-DM) 6.25-15 MG/5ML syrup, Take 5 mL by mouth At Night As Needed for Cough., Disp: 118 mL, Rfl: 0    Allergies:   No Known Allergies    Health  "Maintenance   Topic Date Due    ZOSTER VACCINE (1 of 2) Never done    COVID-19 Vaccine (4 - 2023-24 season) 09/01/2023    ANNUAL PHYSICAL  08/14/2024    BMI FOLLOWUP  08/14/2024    INFLUENZA VACCINE  08/01/2024    COLORECTAL CANCER SCREENING  09/22/2025    TDAP/TD VACCINES (2 - Td or Tdap) 07/02/2028    HEPATITIS C SCREENING  Completed    Pneumococcal Vaccine 0-64  Aged Out       BMI is >= 25 and <30. (Overweight) The following options were offered after discussion;: exercise counseling/recommendations and nutrition counseling/recommendations        Depression: PHQ-2 Depression Screening  Little interest or pleasure in doing things? 0-->not at all   Feeling down, depressed, or hopeless?     PHQ-2 Total Score 0      Intimate partner violence: (Screen on initial visit, older adult with injury or evidence of neglect):  Violence can be a problem in many people's lives, so I now ask every patient about trauma or abuse they may have experienced in a relationship.  Stress/Safety - Do you feel safe in your relationship?  Afraid/Abused - Have you ever been in a relationship where you were threatened, hurt, or afraid?  Friend/Family - Are your friends aware you have been hurt?  Emergency Plan - Do you have a safe place to go and the resources you need in an emergency?    Osteoporosis:   Men: history of low trauma fracture, androgen deprivation therapy for prostate cancer, hypogonadism, primary hyperparathyroidism, intestinal disorders.     Objective     Physical Exam:  Vital Signs:   Vitals:    08/16/24 0959   BP: 154/96   BP Location: Left arm   Patient Position: Sitting   Cuff Size: Adult   Pulse: 95   Resp: 18   Temp: 98.6 °F (37 °C)   TempSrc: Temporal   SpO2: 95%   Weight: 95.9 kg (211 lb 6.4 oz)   Height: 185.4 cm (72.99\")   PainSc: 0-No pain     Body mass index is 27.9 kg/m².     Physical Exam  Constitutional:       General: He is not in acute distress.     Appearance: He is overweight. He is not ill-appearing.   HENT: "      Head: Normocephalic.      Right Ear: Tympanic membrane and ear canal normal.      Left Ear: Tympanic membrane and ear canal normal.      Nose: Nose normal.      Mouth/Throat:      Mouth: Mucous membranes are moist.      Pharynx: No oropharyngeal exudate or posterior oropharyngeal erythema.   Eyes:      Pupils: Pupils are equal, round, and reactive to light.   Neck:      Vascular: No carotid bruit.   Cardiovascular:      Rate and Rhythm: Normal rate and regular rhythm.      Heart sounds: No murmur heard.  Pulmonary:      Effort: Pulmonary effort is normal. No respiratory distress.      Breath sounds: No wheezing, rhonchi or rales.   Abdominal:      General: Abdomen is flat. There is no distension.      Palpations: There is no mass.      Tenderness: There is no abdominal tenderness.   Musculoskeletal:         General: Normal range of motion.   Skin:     General: Skin is warm.   Neurological:      General: No focal deficit present.      Mental Status: He is alert.   Psychiatric:         Mood and Affect: Mood normal.         Procedures    Assessment / Plan      Assessment/Plan:      Assessment and Plan     Diagnoses and all orders for this visit:    1. Encounter for annual health examination (Primary)  Assessment & Plan:  The patient is here for health maintenance visit.  Currently, the patient consumes a unhealthy diet and has an adequate exercise regimen.  Screening lab work is ordered.  Immunizations were reviewed today.  Advice and education was given regarding nutrition, aerobic exercise, routine dental evaluations, routine eye exams, reproductive health, cardiovascular risk reduction, sunscreen use, self skin examination (annual dermatology evaluations) and seatbelt use (general overall safety).  Further recommendations will be given if needed after lab evaluation.  Annual wellness evaluation is recommended.        2. Primary hypertension  Assessment & Plan:  BP poorly controlled today, though patient states  he drank heavily on Wednesday and last night.  He does not check blood pressure at home.  Repeat BP in office today 132/90.  Discussed necessary diet and lifestyle modifications  Will continue losartan 100 mg-patient will follow-up in 4 weeks encouraged to monitor BP at home in the meantime  If blood pressure continues to be elevated, patient will likely benefit from additional BP meds     Orders:  -     Comprehensive Metabolic Panel  -     Lipid Panel  -     CBC & Differential    3. Prostate cancer screening  -     PSA Screen    4. Elevated liver enzymes  Assessment & Plan:  Likely secondary to heavy alcohol use  Will obtain ultrasound of liver  Repeat CMP today    Orders:  -     US Liver; Future    5. Alcohol use  Assessment & Plan:  Reports 2-3 beers and 4-5 shots of Ayana or vodka nightly  Minimal interest in alcohol cessation at this time      Patient is up-to-date on colon cancer screening.  Recommended completing shingles vaccination.     Follow Up:   Return in about 4 weeks (around 9/13/2024) for BP check .    Healthcare Maintenance:   Counseling provided on alcohol cessation, diet and lifestyle modifications  Deepak Albarran voices understanding and acceptance of this advice and will call back with any further questions or concerns. AVS with preventive healthcare tips printed for patient.     Mckenna Donato PA-C    [facility] [place]

## 2024-08-16 NOTE — ASSESSMENT & PLAN NOTE
Reports 2-3 beers and 4-5 shots of Ayana or vodka nightly  Minimal interest in alcohol cessation at this time

## 2024-08-17 DIAGNOSIS — I10 ESSENTIAL HYPERTENSION: ICD-10-CM

## 2024-08-17 DIAGNOSIS — M1A.0790 CHRONIC IDIOPATHIC GOUT INVOLVING TOE WITHOUT TOPHUS, UNSPECIFIED LATERALITY: ICD-10-CM

## 2024-08-19 RX ORDER — ALLOPURINOL 300 MG/1
300 TABLET ORAL DAILY
Qty: 30 TABLET | Refills: 0 | Status: SHIPPED | OUTPATIENT
Start: 2024-08-19

## 2024-08-19 RX ORDER — LOSARTAN POTASSIUM 100 MG/1
100 TABLET ORAL DAILY
Qty: 30 TABLET | Refills: 2 | Status: SHIPPED | OUTPATIENT
Start: 2024-08-19

## 2024-08-22 ENCOUNTER — TRANSCRIBE ORDERS (OUTPATIENT)
Dept: ADMINISTRATIVE | Facility: HOSPITAL | Age: 49
End: 2024-08-22
Payer: COMMERCIAL

## 2024-08-22 DIAGNOSIS — R92.8 ABNORMAL MAMMOGRAM: Primary | ICD-10-CM

## 2024-09-13 ENCOUNTER — OFFICE VISIT (OUTPATIENT)
Dept: FAMILY MEDICINE CLINIC | Facility: CLINIC | Age: 49
End: 2024-09-13
Payer: COMMERCIAL

## 2024-09-13 VITALS
TEMPERATURE: 98.4 F | BODY MASS INDEX: 28.36 KG/M2 | OXYGEN SATURATION: 97 % | HEIGHT: 73 IN | WEIGHT: 214 LBS | DIASTOLIC BLOOD PRESSURE: 96 MMHG | HEART RATE: 80 BPM | SYSTOLIC BLOOD PRESSURE: 148 MMHG

## 2024-09-13 DIAGNOSIS — I10 PRIMARY HYPERTENSION: Primary | ICD-10-CM

## 2024-09-13 DIAGNOSIS — M1A.0790 CHRONIC IDIOPATHIC GOUT INVOLVING TOE WITHOUT TOPHUS, UNSPECIFIED LATERALITY: ICD-10-CM

## 2024-09-13 PROBLEM — J01.40 ACUTE NON-RECURRENT PANSINUSITIS: Status: RESOLVED | Noted: 2024-05-18 | Resolved: 2024-09-13

## 2024-09-13 PROCEDURE — 99214 OFFICE O/P EST MOD 30 MIN: CPT | Performed by: FAMILY MEDICINE

## 2024-09-13 RX ORDER — AMLODIPINE BESYLATE 5 MG/1
5 TABLET ORAL DAILY
Qty: 90 TABLET | Refills: 3 | Status: SHIPPED | OUTPATIENT
Start: 2024-09-13

## 2024-09-13 RX ORDER — ALLOPURINOL 300 MG/1
300 TABLET ORAL DAILY
Qty: 90 TABLET | Refills: 3 | Status: SHIPPED | OUTPATIENT
Start: 2024-09-13

## 2024-09-13 RX ORDER — LOSARTAN POTASSIUM 100 MG/1
100 TABLET ORAL DAILY
Qty: 90 TABLET | Refills: 3 | Status: SHIPPED | OUTPATIENT
Start: 2024-09-13

## 2024-09-13 NOTE — ASSESSMENT & PLAN NOTE
Gout currently well-controlled with allopurinol 300 mg daily.  He has not had gout flare in over a year so he has not needed the colchicine.  Patient will continue allopurinol 300 mg daily.

## 2024-09-13 NOTE — PROGRESS NOTES
Deepak Albarran is a 49 y.o. male who presents today for Hypertension      Patient has been checking his blood pressure at home and has been getting numbers close to what we got here today. He has been losartan 100mg daily as directed and has tolerated these well. He has gout and has not had flare in the last year. He has not been exercising recently. He has not been following a healthy diet but he does not drink soft drinks and drinks a lot of water.         Review of Systems   Constitutional:  Negative for fever and unexpected weight loss.   HENT:  Negative for congestion, ear pain and sore throat.    Eyes:  Negative for visual disturbance.   Respiratory:  Negative for cough, shortness of breath and wheezing.    Cardiovascular:  Negative for chest pain and palpitations.   Gastrointestinal:  Negative for abdominal pain, blood in stool, constipation, diarrhea, nausea, vomiting and GERD.   Endocrine: Negative for polydipsia and polyuria.   Genitourinary:  Negative for difficulty urinating.   Musculoskeletal:  Negative for joint swelling.   Skin:  Negative for rash and skin lesions.   Allergic/Immunologic: Negative for environmental allergies.   Neurological:  Negative for seizures and syncope.   Hematological:  Does not bruise/bleed easily.   Psychiatric/Behavioral:  Negative for suicidal ideas.         The following portions of the patient's history were reviewed and updated as appropriate: allergies, current medications, past family history, past medical history, past social history, past surgical history and problem list.    Current Outpatient Medications on File Prior to Visit   Medication Sig Dispense Refill    colchicine (Mitigare) 0.6 MG capsule capsule Take 1 capsule by mouth 2 (Two) Times a Day As Needed (gout flare). 30 capsule 3    [DISCONTINUED] allopurinol (ZYLOPRIM) 300 MG tablet TAKE 1 TABLET BY MOUTH DAILY 30 tablet 0    [DISCONTINUED] losartan (COZAAR) 100 MG tablet TAKE 1 TABLET BY MOUTH DAILY 30  "tablet 2     No current facility-administered medications on file prior to visit.       No Known Allergies     Visit Vitals  /96   Pulse 80   Temp 98.4 °F (36.9 °C) (Infrared)   Ht 185.4 cm (73\")   Wt 97.1 kg (214 lb)   SpO2 97%   BMI 28.23 kg/m²        Physical Exam  Constitutional:       General: He is not in acute distress.     Appearance: He is well-developed. He is not diaphoretic.   HENT:      Head: Atraumatic.   Cardiovascular:      Rate and Rhythm: Normal rate and regular rhythm.      Heart sounds: Normal heart sounds. No murmur heard.     No friction rub. No gallop.   Pulmonary:      Effort: Pulmonary effort is normal. No respiratory distress.      Breath sounds: Normal breath sounds. No stridor. No wheezing, rhonchi or rales.   Musculoskeletal:      Cervical back: Normal range of motion and neck supple.   Skin:     General: Skin is warm and dry.   Neurological:      Mental Status: He is alert and oriented to person, place, and time.   Psychiatric:         Behavior: Behavior normal.          Results for orders placed or performed in visit on 08/16/24   Comprehensive Metabolic Panel    Specimen: Blood   Result Value Ref Range    Glucose 100 (H) 65 - 99 mg/dL    BUN 11 6 - 20 mg/dL    Creatinine 1.01 0.76 - 1.27 mg/dL    Sodium 137 136 - 145 mmol/L    Potassium 4.6 3.5 - 5.2 mmol/L    Chloride 103 98 - 107 mmol/L    CO2 22.9 22.0 - 29.0 mmol/L    Calcium 10.1 8.6 - 10.5 mg/dL    Total Protein 7.4 6.0 - 8.5 g/dL    Albumin 4.7 3.5 - 5.2 g/dL    ALT (SGPT) 53 (H) 1 - 41 U/L    AST (SGOT) 34 1 - 40 U/L    Alkaline Phosphatase 100 39 - 117 U/L    Total Bilirubin 0.8 0.0 - 1.2 mg/dL    Globulin 2.7 gm/dL    A/G Ratio 1.7 g/dL    BUN/Creatinine Ratio 10.9 7.0 - 25.0    Anion Gap 11.1 5.0 - 15.0 mmol/L    eGFR 90.0 >60.0 mL/min/1.73   Lipid Panel    Specimen: Blood   Result Value Ref Range    Total Cholesterol 190 0 - 200 mg/dL    Triglycerides 156 (H) 0 - 150 mg/dL    HDL Cholesterol 39 (L) 40 - 60 mg/dL    " LDL Cholesterol  123 (H) 0 - 100 mg/dL    VLDL Cholesterol 28 5 - 40 mg/dL    LDL/HDL Ratio 3.07    PSA Screen    Specimen: Blood   Result Value Ref Range    PSA 0.905 0.000 - 4.000 ng/mL   CBC Auto Differential    Specimen: Blood   Result Value Ref Range    WBC 4.59 3.40 - 10.80 10*3/mm3    RBC 5.20 4.14 - 5.80 10*6/mm3    Hemoglobin 18.0 (H) 13.0 - 17.7 g/dL    Hematocrit 51.1 (H) 37.5 - 51.0 %    MCV 98.3 (H) 79.0 - 97.0 fL    MCH 34.6 (H) 26.6 - 33.0 pg    MCHC 35.2 31.5 - 35.7 g/dL    RDW 12.6 12.3 - 15.4 %    RDW-SD 45.0 37.0 - 54.0 fl    MPV 9.5 6.0 - 12.0 fL    Platelets 177 140 - 450 10*3/mm3    Neutrophil % 60.0 42.7 - 76.0 %    Lymphocyte % 29.6 19.6 - 45.3 %    Monocyte % 8.3 5.0 - 12.0 %    Eosinophil % 1.5 0.3 - 6.2 %    Basophil % 0.4 0.0 - 1.5 %    Immature Grans % 0.2 0.0 - 0.5 %    Neutrophils, Absolute 2.75 1.70 - 7.00 10*3/mm3    Lymphocytes, Absolute 1.36 0.70 - 3.10 10*3/mm3    Monocytes, Absolute 0.38 0.10 - 0.90 10*3/mm3    Eosinophils, Absolute 0.07 0.00 - 0.40 10*3/mm3    Basophils, Absolute 0.02 0.00 - 0.20 10*3/mm3    Immature Grans, Absolute 0.01 0.00 - 0.05 10*3/mm3    nRBC 0.0 0.0 - 0.2 /100 WBC        Problems Addressed this Visit          Cardiac and Vasculature    HTN (hypertension) - Primary     Hypertension is uncontrolled  Medication changes per orders.  Dietary sodium restriction.  Weight loss.  Regular aerobic exercise.  Ambulatory blood pressure monitoring.  Blood pressure will be reassessedin 3 months.         Relevant Medications    amLODIPine (NORVASC) 5 MG tablet    losartan (COZAAR) 100 MG tablet       Musculoskeletal and Injuries    Gout     Gout currently well-controlled with allopurinol 300 mg daily.  He has not had gout flare in over a year so he has not needed the colchicine.  Patient will continue allopurinol 300 mg daily.         Relevant Medications    allopurinol (ZYLOPRIM) 300 MG tablet     Diagnoses         Codes Comments    Primary hypertension    -  Primary  ICD-10-CM: I10  ICD-9-CM: 401.9     Chronic idiopathic gout involving toe without tophus, unspecified laterality     ICD-10-CM: M1A.0790  ICD-9-CM: 274.02             Return in about 4 weeks (around 10/11/2024) for Follow-up HTN.    Nii Child MD   9/13/2024

## 2024-09-13 NOTE — ASSESSMENT & PLAN NOTE
Hypertension is uncontrolled  Medication changes per orders.  Dietary sodium restriction.  Weight loss.  Regular aerobic exercise.  Ambulatory blood pressure monitoring.  Blood pressure will be reassessedin 3 months.

## 2024-09-20 DIAGNOSIS — M1A.0790 CHRONIC IDIOPATHIC GOUT INVOLVING TOE WITHOUT TOPHUS, UNSPECIFIED LATERALITY: ICD-10-CM

## 2024-09-20 RX ORDER — ALLOPURINOL 300 MG/1
300 TABLET ORAL DAILY
Qty: 90 TABLET | Refills: 3 | Status: SHIPPED | OUTPATIENT
Start: 2024-09-20

## 2024-10-10 ENCOUNTER — OFFICE VISIT (OUTPATIENT)
Dept: FAMILY MEDICINE CLINIC | Facility: CLINIC | Age: 49
End: 2024-10-10
Payer: COMMERCIAL

## 2024-10-10 ENCOUNTER — LAB (OUTPATIENT)
Dept: LAB | Facility: HOSPITAL | Age: 49
End: 2024-10-10
Payer: COMMERCIAL

## 2024-10-10 VITALS
HEIGHT: 73 IN | HEART RATE: 88 BPM | BODY MASS INDEX: 28.36 KG/M2 | OXYGEN SATURATION: 99 % | WEIGHT: 214 LBS | TEMPERATURE: 98 F | SYSTOLIC BLOOD PRESSURE: 134 MMHG | DIASTOLIC BLOOD PRESSURE: 74 MMHG

## 2024-10-10 DIAGNOSIS — I10 PRIMARY HYPERTENSION: Primary | ICD-10-CM

## 2024-10-10 DIAGNOSIS — R74.8 ELEVATED LIVER ENZYMES: ICD-10-CM

## 2024-10-10 DIAGNOSIS — I10 PRIMARY HYPERTENSION: ICD-10-CM

## 2024-10-10 LAB
ALBUMIN SERPL-MCNC: 4.6 G/DL (ref 3.5–5.2)
ALBUMIN/GLOB SERPL: 1.8 G/DL
ALP SERPL-CCNC: 108 U/L (ref 39–117)
ALT SERPL W P-5'-P-CCNC: 66 U/L (ref 1–41)
ANION GAP SERPL CALCULATED.3IONS-SCNC: 11 MMOL/L (ref 5–15)
AST SERPL-CCNC: 56 U/L (ref 1–40)
BILIRUB SERPL-MCNC: 0.8 MG/DL (ref 0–1.2)
BUN SERPL-MCNC: 11 MG/DL (ref 6–20)
BUN/CREAT SERPL: 13.8 (ref 7–25)
CALCIUM SPEC-SCNC: 9.7 MG/DL (ref 8.6–10.5)
CHLORIDE SERPL-SCNC: 100 MMOL/L (ref 98–107)
CO2 SERPL-SCNC: 25 MMOL/L (ref 22–29)
CREAT SERPL-MCNC: 0.8 MG/DL (ref 0.76–1.27)
EGFRCR SERPLBLD CKD-EPI 2021: 107.1 ML/MIN/1.73
GLOBULIN UR ELPH-MCNC: 2.5 GM/DL
GLUCOSE SERPL-MCNC: 96 MG/DL (ref 65–99)
POTASSIUM SERPL-SCNC: 4.4 MMOL/L (ref 3.5–5.2)
PROT SERPL-MCNC: 7.1 G/DL (ref 6–8.5)
SODIUM SERPL-SCNC: 136 MMOL/L (ref 136–145)

## 2024-10-10 PROCEDURE — 99213 OFFICE O/P EST LOW 20 MIN: CPT | Performed by: FAMILY MEDICINE

## 2024-10-10 PROCEDURE — 80053 COMPREHEN METABOLIC PANEL: CPT

## 2024-10-10 NOTE — PROGRESS NOTES
Deepak Albarran is a 49 y.o. male who presents today for Hypertension      Patient has been checking blood pressure at home but his machine is old and he is not seeing numbers close to what we got here today. He has been taking losartan and amlodipine as directed. He has tolerated them well. He has no acute complaints today.        Review of Systems   Constitutional:  Negative for fever and unexpected weight loss.   HENT:  Negative for congestion, ear pain and sore throat.    Eyes:  Negative for visual disturbance.   Respiratory:  Negative for cough, shortness of breath and wheezing.    Cardiovascular:  Negative for chest pain and palpitations.   Gastrointestinal:  Negative for abdominal pain, blood in stool, constipation, diarrhea, nausea, vomiting and GERD.   Endocrine: Negative for polydipsia and polyuria.   Genitourinary:  Negative for difficulty urinating.   Musculoskeletal:  Negative for joint swelling.   Skin:  Negative for rash and skin lesions.   Allergic/Immunologic: Negative for environmental allergies.   Neurological:  Negative for seizures and syncope.   Hematological:  Does not bruise/bleed easily.   Psychiatric/Behavioral:  Negative for suicidal ideas.         The following portions of the patient's history were reviewed and updated as appropriate: allergies, current medications, past family history, past medical history, past social history, past surgical history and problem list.    Current Outpatient Medications on File Prior to Visit   Medication Sig Dispense Refill    allopurinol (ZYLOPRIM) 300 MG tablet TAKE 1 TABLET BY MOUTH DAILY 90 tablet 3    amLODIPine (NORVASC) 5 MG tablet Take 1 tablet by mouth Daily. 90 tablet 3    colchicine (Mitigare) 0.6 MG capsule capsule Take 1 capsule by mouth 2 (Two) Times a Day As Needed (gout flare). 30 capsule 3    losartan (COZAAR) 100 MG tablet Take 1 tablet by mouth Daily. 90 tablet 3     No current facility-administered medications on file prior to visit.  "      No Known Allergies     Visit Vitals  /74   Pulse 88   Temp 98 °F (36.7 °C)   Ht 185.4 cm (73\")   Wt 97.1 kg (214 lb)   SpO2 99%   BMI 28.23 kg/m²        Physical Exam  Constitutional:       General: He is not in acute distress.     Appearance: He is well-developed. He is not diaphoretic.   HENT:      Head: Atraumatic.   Cardiovascular:      Rate and Rhythm: Normal rate and regular rhythm.      Heart sounds: Normal heart sounds. No murmur heard.     No friction rub. No gallop.   Pulmonary:      Effort: Pulmonary effort is normal. No respiratory distress.      Breath sounds: Normal breath sounds. No stridor. No wheezing, rhonchi or rales.   Musculoskeletal:      Cervical back: Normal range of motion and neck supple.   Skin:     General: Skin is warm and dry.   Neurological:      Mental Status: He is alert and oriented to person, place, and time.   Psychiatric:         Behavior: Behavior normal.          Results for orders placed or performed in visit on 08/16/24   Comprehensive Metabolic Panel    Specimen: Blood   Result Value Ref Range    Glucose 100 (H) 65 - 99 mg/dL    BUN 11 6 - 20 mg/dL    Creatinine 1.01 0.76 - 1.27 mg/dL    Sodium 137 136 - 145 mmol/L    Potassium 4.6 3.5 - 5.2 mmol/L    Chloride 103 98 - 107 mmol/L    CO2 22.9 22.0 - 29.0 mmol/L    Calcium 10.1 8.6 - 10.5 mg/dL    Total Protein 7.4 6.0 - 8.5 g/dL    Albumin 4.7 3.5 - 5.2 g/dL    ALT (SGPT) 53 (H) 1 - 41 U/L    AST (SGOT) 34 1 - 40 U/L    Alkaline Phosphatase 100 39 - 117 U/L    Total Bilirubin 0.8 0.0 - 1.2 mg/dL    Globulin 2.7 gm/dL    A/G Ratio 1.7 g/dL    BUN/Creatinine Ratio 10.9 7.0 - 25.0    Anion Gap 11.1 5.0 - 15.0 mmol/L    eGFR 90.0 >60.0 mL/min/1.73   Lipid Panel    Specimen: Blood   Result Value Ref Range    Total Cholesterol 190 0 - 200 mg/dL    Triglycerides 156 (H) 0 - 150 mg/dL    HDL Cholesterol 39 (L) 40 - 60 mg/dL    LDL Cholesterol  123 (H) 0 - 100 mg/dL    VLDL Cholesterol 28 5 - 40 mg/dL    LDL/HDL Ratio 3.07 "    PSA Screen    Specimen: Blood   Result Value Ref Range    PSA 0.905 0.000 - 4.000 ng/mL   CBC Auto Differential    Specimen: Blood   Result Value Ref Range    WBC 4.59 3.40 - 10.80 10*3/mm3    RBC 5.20 4.14 - 5.80 10*6/mm3    Hemoglobin 18.0 (H) 13.0 - 17.7 g/dL    Hematocrit 51.1 (H) 37.5 - 51.0 %    MCV 98.3 (H) 79.0 - 97.0 fL    MCH 34.6 (H) 26.6 - 33.0 pg    MCHC 35.2 31.5 - 35.7 g/dL    RDW 12.6 12.3 - 15.4 %    RDW-SD 45.0 37.0 - 54.0 fl    MPV 9.5 6.0 - 12.0 fL    Platelets 177 140 - 450 10*3/mm3    Neutrophil % 60.0 42.7 - 76.0 %    Lymphocyte % 29.6 19.6 - 45.3 %    Monocyte % 8.3 5.0 - 12.0 %    Eosinophil % 1.5 0.3 - 6.2 %    Basophil % 0.4 0.0 - 1.5 %    Immature Grans % 0.2 0.0 - 0.5 %    Neutrophils, Absolute 2.75 1.70 - 7.00 10*3/mm3    Lymphocytes, Absolute 1.36 0.70 - 3.10 10*3/mm3    Monocytes, Absolute 0.38 0.10 - 0.90 10*3/mm3    Eosinophils, Absolute 0.07 0.00 - 0.40 10*3/mm3    Basophils, Absolute 0.02 0.00 - 0.20 10*3/mm3    Immature Grans, Absolute 0.01 0.00 - 0.05 10*3/mm3    nRBC 0.0 0.0 - 0.2 /100 WBC        Problems Addressed this Visit          Cardiac and Vasculature    HTN (hypertension) - Primary     Hypertension is stable and controlled  Continue current treatment regimen.  Blood pressure will be reassessed in 3 months.         Relevant Orders    Comprehensive Metabolic Panel       Gastrointestinal Abdominal     Elevated liver enzymes     Patient has liver ultrasound ordered and was advised to call to schedule appointment to have the ultrasound completed.  Repeat CMP today.         Relevant Orders    Comprehensive Metabolic Panel     Diagnoses         Codes Comments    Primary hypertension    -  Primary ICD-10-CM: I10  ICD-9-CM: 401.9     Elevated liver enzymes     ICD-10-CM: R74.8  ICD-9-CM: 790.5             Return in about 3 months (around 1/10/2025) for Follow-up HTN.    Nii Child MD   10/10/2024

## 2024-10-10 NOTE — ASSESSMENT & PLAN NOTE
Patient has liver ultrasound ordered and was advised to call to schedule appointment to have the ultrasound completed.  Repeat CMP today.

## 2024-10-11 ENCOUNTER — TELEPHONE (OUTPATIENT)
Dept: FAMILY MEDICINE CLINIC | Facility: CLINIC | Age: 49
End: 2024-10-11
Payer: COMMERCIAL

## 2024-10-11 NOTE — TELEPHONE ENCOUNTER
Please let the patient know that labs that were drawn at previous visit were stable except for liver function numbers which have increased slightly from previous labs drawn a month ago and her back to about the same level they were a year ago. Patient should continue current treatment plan call to schedule ultrasound of his liver and work on decreasing alcohol intake.  I will also recommend referral to gastroenterology for further evaluation of liver function numbers.  If patient is agreeable to this please let me know and I will place the order.  If patient has any questions they can contact me.    Called patient and read him the above message. He is going to call back for the gastroenterology referral     Addended by: Cary Hartman on: 8/3/2020 12:41 PM     Modules accepted: Orders

## 2025-01-10 ENCOUNTER — LAB (OUTPATIENT)
Dept: LAB | Facility: HOSPITAL | Age: 50
End: 2025-01-10
Payer: COMMERCIAL

## 2025-01-10 ENCOUNTER — OFFICE VISIT (OUTPATIENT)
Dept: FAMILY MEDICINE CLINIC | Facility: CLINIC | Age: 50
End: 2025-01-10
Payer: COMMERCIAL

## 2025-01-10 VITALS
DIASTOLIC BLOOD PRESSURE: 80 MMHG | HEART RATE: 84 BPM | WEIGHT: 217 LBS | OXYGEN SATURATION: 100 % | HEIGHT: 73 IN | TEMPERATURE: 98 F | SYSTOLIC BLOOD PRESSURE: 142 MMHG | BODY MASS INDEX: 28.76 KG/M2

## 2025-01-10 DIAGNOSIS — R74.8 ELEVATED LIVER ENZYMES: ICD-10-CM

## 2025-01-10 DIAGNOSIS — J01.40 ACUTE NON-RECURRENT PANSINUSITIS: ICD-10-CM

## 2025-01-10 DIAGNOSIS — R05.1 ACUTE COUGH: ICD-10-CM

## 2025-01-10 DIAGNOSIS — I10 PRIMARY HYPERTENSION: Primary | ICD-10-CM

## 2025-01-10 LAB
ALBUMIN SERPL-MCNC: 4.5 G/DL (ref 3.5–5.2)
ALBUMIN/GLOB SERPL: 1.7 G/DL
ALP SERPL-CCNC: 98 U/L (ref 39–117)
ALT SERPL W P-5'-P-CCNC: 42 U/L (ref 1–41)
ANION GAP SERPL CALCULATED.3IONS-SCNC: 9.1 MMOL/L (ref 5–15)
AST SERPL-CCNC: 32 U/L (ref 1–40)
BILIRUB SERPL-MCNC: 0.6 MG/DL (ref 0–1.2)
BUN SERPL-MCNC: 11 MG/DL (ref 6–20)
BUN/CREAT SERPL: 11.1 (ref 7–25)
CALCIUM SPEC-SCNC: 9.7 MG/DL (ref 8.6–10.5)
CHLORIDE SERPL-SCNC: 100 MMOL/L (ref 98–107)
CO2 SERPL-SCNC: 26.9 MMOL/L (ref 22–29)
CREAT SERPL-MCNC: 0.99 MG/DL (ref 0.76–1.27)
EGFRCR SERPLBLD CKD-EPI 2021: 92.2 ML/MIN/1.73
GLOBULIN UR ELPH-MCNC: 2.7 GM/DL
GLUCOSE SERPL-MCNC: 92 MG/DL (ref 65–99)
POTASSIUM SERPL-SCNC: 4.8 MMOL/L (ref 3.5–5.2)
PROT SERPL-MCNC: 7.2 G/DL (ref 6–8.5)
SODIUM SERPL-SCNC: 136 MMOL/L (ref 136–145)

## 2025-01-10 PROCEDURE — 80053 COMPREHEN METABOLIC PANEL: CPT | Performed by: FAMILY MEDICINE

## 2025-01-10 PROCEDURE — 99213 OFFICE O/P EST LOW 20 MIN: CPT | Performed by: FAMILY MEDICINE

## 2025-01-10 RX ORDER — METHYLPREDNISOLONE 4 MG/1
TABLET ORAL
Qty: 21 TABLET | Refills: 0 | Status: SHIPPED | OUTPATIENT
Start: 2025-01-10 | End: 2025-01-15

## 2025-01-10 RX ORDER — DEXTROMETHORPHAN HYDROBROMIDE AND PROMETHAZINE HYDROCHLORIDE 15; 6.25 MG/5ML; MG/5ML
5 SYRUP ORAL 4 TIMES DAILY PRN
Qty: 100 ML | Refills: 0 | Status: SHIPPED | OUTPATIENT
Start: 2025-01-10

## 2025-01-10 RX ORDER — FLUTICASONE PROPIONATE 50 MCG
2 SPRAY, SUSPENSION (ML) NASAL DAILY
Qty: 16 G | Refills: 1 | Status: SHIPPED | OUTPATIENT
Start: 2025-01-10

## 2025-01-10 RX ORDER — LORATADINE 10 MG/1
10 TABLET ORAL DAILY
Qty: 30 TABLET | Refills: 3 | Status: SHIPPED | OUTPATIENT
Start: 2025-01-10

## 2025-01-10 NOTE — ASSESSMENT & PLAN NOTE
Blood pressure had been improving but is mildly elevated today likely due to current illness.  Patient will continue current medications and was encouraged to monitor blood pressure at home.  Will recheck blood pressure at his follow-up visit if remains elevated will adjust medication at that time.  Patient follow-up in 3 months.

## 2025-01-10 NOTE — PROGRESS NOTES
Deepak Albarran is a 49 y.o. male who presents today for Headache, Cough, and Hypertension      He has been taking losartan and amlodipine but has not been checking his blood pressure at home. He has tolerated these well. He has been having a headache, sore throat, cough, fatigue, and wheezing which started Monday and has progressed through the week. He has mild body aches but headache is worse. He has taken dayquil and nyquil with minimal relief. He has not had fever, chills, CP, palpitations, SOA, N/V/D/C, dizziness, ear pain, or urinary symptoms. He has not had sick contacts that he knows of. He has not takign home covid or flu tests and states this does not feel like when he has these in the past. Declines testing.              Review of Systems   Constitutional:  Positive for fatigue. Negative for chills, diaphoresis, fever and unexpected weight loss.   HENT:  Positive for congestion, postnasal drip, rhinorrhea, sinus pressure (pain) and sore throat. Negative for ear pain and nosebleeds.    Eyes:  Negative for visual disturbance.   Respiratory:  Positive for cough and wheezing. Negative for shortness of breath.    Cardiovascular:  Negative for chest pain and palpitations.   Gastrointestinal:  Negative for abdominal pain, blood in stool, constipation, diarrhea, nausea, vomiting and GERD.   Endocrine: Negative for polydipsia and polyuria.   Genitourinary:  Negative for difficulty urinating.   Musculoskeletal:  Negative for joint swelling.   Skin:  Negative for rash and skin lesions.   Allergic/Immunologic: Negative for environmental allergies.   Neurological:  Positive for headache. Negative for dizziness, seizures, syncope and light-headedness.   Hematological:  Does not bruise/bleed easily.   Psychiatric/Behavioral:  Negative for suicidal ideas.         The following portions of the patient's history were reviewed and updated as appropriate: allergies, current medications, past family history, past medical  "history, past social history, past surgical history and problem list.    Current Outpatient Medications on File Prior to Visit   Medication Sig Dispense Refill    allopurinol (ZYLOPRIM) 300 MG tablet TAKE 1 TABLET BY MOUTH DAILY 90 tablet 3    amLODIPine (NORVASC) 5 MG tablet Take 1 tablet by mouth Daily. 90 tablet 3    colchicine (Mitigare) 0.6 MG capsule capsule Take 1 capsule by mouth 2 (Two) Times a Day As Needed (gout flare). 30 capsule 3    losartan (COZAAR) 100 MG tablet Take 1 tablet by mouth Daily. 90 tablet 3     No current facility-administered medications on file prior to visit.       No Known Allergies     Visit Vitals  /80 (BP Location: Right arm, Patient Position: Sitting, Cuff Size: Adult)   Pulse 84   Temp 98 °F (36.7 °C) (Infrared)   Ht 185.4 cm (73\")   Wt 98.4 kg (217 lb)   SpO2 100%   BMI 28.63 kg/m²        Physical Exam  Constitutional:       General: He is not in acute distress.     Appearance: He is well-developed. He is not diaphoretic.   HENT:      Head: Atraumatic.      Right Ear: No swelling or tenderness. A middle ear effusion is present. There is no impacted cerumen. Tympanic membrane is not perforated, erythematous, retracted or bulging.      Left Ear: No swelling or tenderness. A middle ear effusion is present. There is no impacted cerumen. Tympanic membrane is not perforated, erythematous, retracted or bulging.      Nose: Congestion and rhinorrhea present. Rhinorrhea is clear.      Right Sinus: Maxillary sinus tenderness and frontal sinus tenderness present.      Left Sinus: Maxillary sinus tenderness and frontal sinus tenderness present.      Mouth/Throat:      Pharynx: Postnasal drip present. No oropharyngeal exudate or posterior oropharyngeal erythema.   Cardiovascular:      Rate and Rhythm: Normal rate and regular rhythm.      Heart sounds: Normal heart sounds. No murmur heard.     No friction rub. No gallop.   Pulmonary:      Effort: Pulmonary effort is normal. No " respiratory distress.      Breath sounds: No stridor. Wheezing present. No rhonchi or rales.   Musculoskeletal:      Cervical back: Normal range of motion and neck supple.   Skin:     General: Skin is warm and dry.   Neurological:      Mental Status: He is alert and oriented to person, place, and time.   Psychiatric:         Behavior: Behavior normal.          Results for orders placed or performed in visit on 10/10/24   Comprehensive Metabolic Panel    Collection Time: 10/10/24 12:02 PM    Specimen: Blood   Result Value Ref Range    Glucose 96 65 - 99 mg/dL    BUN 11 6 - 20 mg/dL    Creatinine 0.80 0.76 - 1.27 mg/dL    Sodium 136 136 - 145 mmol/L    Potassium 4.4 3.5 - 5.2 mmol/L    Chloride 100 98 - 107 mmol/L    CO2 25.0 22.0 - 29.0 mmol/L    Calcium 9.7 8.6 - 10.5 mg/dL    Total Protein 7.1 6.0 - 8.5 g/dL    Albumin 4.6 3.5 - 5.2 g/dL    ALT (SGPT) 66 (H) 1 - 41 U/L    AST (SGOT) 56 (H) 1 - 40 U/L    Alkaline Phosphatase 108 39 - 117 U/L    Total Bilirubin 0.8 0.0 - 1.2 mg/dL    Globulin 2.5 gm/dL    A/G Ratio 1.8 g/dL    BUN/Creatinine Ratio 13.8 7.0 - 25.0    Anion Gap 11.0 5.0 - 15.0 mmol/L    eGFR 107.1 >60.0 mL/min/1.73        Problems Addressed this Visit          Cardiac and Vasculature    HTN (hypertension) - Primary     Blood pressure had been improving but is mildly elevated today likely due to current illness.  Patient will continue current medications and was encouraged to monitor blood pressure at home.  Will recheck blood pressure at his follow-up visit if remains elevated will adjust medication at that time.  Patient follow-up in 3 months.         Relevant Orders    Comprehensive Metabolic Panel       ENT    Acute non-recurrent pansinusitis     Signs symptoms concerning for acute sinusitis and may have some bronchitis as well.  Patient declines COVID, flu, and strep test.  Patient will use Flonase, Claritin, steroid Dosepak, and Promethazine DM for symptomatic relief.  Will treat with Augmentin.   RTC/ED precautions given.         Relevant Medications    amoxicillin-clavulanate (AUGMENTIN) 875-125 MG per tablet    methylPREDNISolone (MEDROL) 4 MG dose pack    loratadine (CLARITIN) 10 MG tablet    fluticasone (FLONASE) 50 MCG/ACT nasal spray    promethazine-dextromethorphan (PROMETHAZINE-DM) 6.25-15 MG/5ML syrup       Gastrointestinal Abdominal     Elevated liver enzymes     Will recheck today.  Liver ultrasound is pending and patient was encouraged to call to set up an appointment to complete this.         Relevant Orders    Comprehensive Metabolic Panel     Other Visit Diagnoses       Acute cough        Relevant Medications    promethazine-dextromethorphan (PROMETHAZINE-DM) 6.25-15 MG/5ML syrup          Diagnoses         Codes Comments    Primary hypertension    -  Primary ICD-10-CM: I10  ICD-9-CM: 401.9     Elevated liver enzymes     ICD-10-CM: R74.8  ICD-9-CM: 790.5     Acute non-recurrent pansinusitis     ICD-10-CM: J01.40  ICD-9-CM: 461.8     Acute cough     ICD-10-CM: R05.1  ICD-9-CM: 786.2             Return in about 3 months (around 4/10/2025) for Follow-up HTN, elevated lfts, or sooner if needed.    Nii Child MD   1/10/2025

## 2025-01-10 NOTE — ASSESSMENT & PLAN NOTE
Signs symptoms concerning for acute sinusitis and may have some bronchitis as well.  Patient declines COVID, flu, and strep test.  Patient will use Flonase, Claritin, steroid Dosepak, and Promethazine DM for symptomatic relief.  Will treat with Augmentin.  RTC/ED precautions given.

## 2025-01-10 NOTE — ASSESSMENT & PLAN NOTE
Will recheck today.  Liver ultrasound is pending and patient was encouraged to call to set up an appointment to complete this.

## 2025-01-17 ENCOUNTER — TELEPHONE (OUTPATIENT)
Dept: FAMILY MEDICINE CLINIC | Facility: CLINIC | Age: 50
End: 2025-01-17
Payer: COMMERCIAL

## 2025-01-17 NOTE — TELEPHONE ENCOUNTER
"I called patient and made him aware of the following: \"If rash does not improve please have him return to clinic for further evaluation her go back to the urgent treatment center for further evaluation if he is not able to get an appointment in timely manner. \"  "

## 2025-01-17 NOTE — TELEPHONE ENCOUNTER
Patient has rash on face its swollen, flaky , red and tight and hot to touch. He went to [facility] on Wednesday he tested positive for strep and they changed his antibiotic. But things are not getting better

## 2025-01-20 ENCOUNTER — OFFICE VISIT (OUTPATIENT)
Dept: FAMILY MEDICINE CLINIC | Facility: CLINIC | Age: 50
End: 2025-01-20
Payer: COMMERCIAL

## 2025-01-20 VITALS
SYSTOLIC BLOOD PRESSURE: 124 MMHG | DIASTOLIC BLOOD PRESSURE: 82 MMHG | BODY MASS INDEX: 28.95 KG/M2 | OXYGEN SATURATION: 97 % | HEART RATE: 80 BPM | TEMPERATURE: 99.8 F | WEIGHT: 219.4 LBS

## 2025-01-20 DIAGNOSIS — R21 RASH: Primary | ICD-10-CM

## 2025-01-20 PROCEDURE — 96372 THER/PROPH/DIAG INJ SC/IM: CPT | Performed by: HOSPITALIST

## 2025-01-20 PROCEDURE — 99214 OFFICE O/P EST MOD 30 MIN: CPT | Performed by: HOSPITALIST

## 2025-01-20 RX ORDER — METHYLPREDNISOLONE SODIUM SUCCINATE 125 MG/2ML
125 INJECTION INTRAMUSCULAR; INTRAVENOUS ONCE
Status: COMPLETED | OUTPATIENT
Start: 2025-01-20 | End: 2025-01-20

## 2025-01-20 RX ORDER — METHYLPREDNISOLONE SODIUM SUCCINATE 125 MG/2ML
125 INJECTION INTRAMUSCULAR; INTRAVENOUS EVERY 6 HOURS
Status: DISCONTINUED | OUTPATIENT
Start: 2025-01-20 | End: 2025-01-20

## 2025-01-20 RX ADMIN — METHYLPREDNISOLONE SODIUM SUCCINATE 125 MG: 125 INJECTION INTRAMUSCULAR; INTRAVENOUS at 11:53

## 2025-01-20 NOTE — ASSESSMENT & PLAN NOTE
After discussing with the patient, we believe the Flonase is the culprit for the patient's facial rash.  Discontinue use.  Patient given a Solu-Medrol shot in the office to help with ongoing rash and recommend the patient continue to use hydrocortisone cream until the rash clears up.  Return to the clinic or send me a message in 2 to 3 days if the rash does not improve.

## 2025-01-20 NOTE — PROGRESS NOTES
Follow Up Office Visit      Patient Name: Deepak Alabrran  : 1975   MRN: [id number]     Chief Complaint:  rash on face (Has had rash on face since 1/10/2025. Feels like a real bad sun burn, hot, flaky, red, itchy, tight. Thinks its an reaction from medication.  )     History of Present Illness: Deepak Albarran is a 49 y.o. male who is here today for acute complaint of a rash on his face. The patient states the rash began about 10 days ago after starting a new medication and he has burning, dryness, pain, redness, swelling, peeling and scaling. He states he has also had facial swelling that began on Saturday and into , he states that his eyes swelled shut at 1 point.  He states that now his face is just flaking and peeling for the most part underneath his eyes across his nose and up into the T-zone between his eyebrows.  He states that of the new medications he was given on 1/10/2025, the only 1 he has never had before his Flonase.  He states that he believes this may be the culprit, as his rash follows the path of his sinuses where the Flonase was sprayed.  He states he used the Flonase on  and Monday.  He states that the red, hot and burning feeling has improved over the last few days.  He has been using hydrocortisone cream on his face and states that has helped some.        Subjective     Subjective          The following portions of the patient's history were reviewed and updated as appropriate: allergies, current medications, past family history, past medical history, past social history, past surgical history and problem list.    Allergy:   No Known Allergies     Current Medications:   Current Outpatient Medications   Medication Sig Dispense Refill    albuterol sulfate  (90 Base) MCG/ACT inhaler Inhale 2 puffs Every 4 (Four) Hours As Needed for Wheezing or Shortness of Air. 8 g 0    allopurinol (ZYLOPRIM) 300 MG tablet TAKE 1 TABLET BY MOUTH DAILY 90 tablet 3     amLODIPine (NORVASC) 5 MG tablet Take 1 tablet by mouth Daily. 90 tablet 3    cefuroxime (CEFTIN) 500 MG tablet Take 1 tablet by mouth 2 (Two) Times a Day. 20 tablet 0    losartan (COZAAR) 100 MG tablet Take 1 tablet by mouth Daily. 90 tablet 3     Current Facility-Administered Medications   Medication Dose Route Frequency Provider Last Rate Last Admin    methylPREDNISolone sodium succinate (SOLU-Medrol) injection 125 mg  125 mg Intravenous Q6H Roz Helton, DO           Objective     Objective     Physical Exam:  Physical Exam  Vitals and nursing note reviewed.   Constitutional:       Appearance: Normal appearance.   HENT:      Head: Normocephalic and atraumatic.      Mouth/Throat:      Mouth: Mucous membranes are moist.   Eyes:      Pupils: Pupils are equal, round, and reactive to light.   Cardiovascular:      Rate and Rhythm: Normal rate and regular rhythm.      Heart sounds: Normal heart sounds.   Pulmonary:      Effort: Pulmonary effort is normal.      Breath sounds: Normal breath sounds.   Abdominal:      General: Bowel sounds are normal.      Palpations: Abdomen is soft.   Musculoskeletal:         General: Normal range of motion.      Cervical back: Normal range of motion.   Skin:     General: Skin is warm and dry.      Comments: Dry, peeling/cracking skin on the bridge of the nose and underneath the bilateral eyes moving up into the T-zone between the eyebrows.  Slight erythema in this area as well.  No lesions   Neurological:      General: No focal deficit present.      Mental Status: He is alert and oriented to person, place, and time.   Psychiatric:         Mood and Affect: Mood normal.         Behavior: Behavior normal.         Vital Signs:   /82   Pulse 80   Temp 99.8 °F (37.7 °C) (Temporal)   Wt 99.5 kg (219 lb 6.4 oz)   SpO2 97%   BMI 28.95 kg/m²            PHQ-9 Score  PHQ-9 Total Score:      Lab Review  Admission on 01/15/2025, Discharged on 01/15/2025   Component Date Value Ref  Range Status    Rapid Strep A Screen 01/15/2025 Positive (A)   Final    Internal Control 01/15/2025 Passed   Final    Lot Number 01/15/2025 [id number]   Final    Expiration Date 01/15/2025 12-11-26   Final   Office Visit on 01/10/2025   Component Date Value Ref Range Status    Glucose 01/10/2025 92  65 - 99 mg/dL Final    BUN 01/10/2025 11  6 - 20 mg/dL Final    Creatinine 01/10/2025 0.99  0.76 - 1.27 mg/dL Final    Sodium 01/10/2025 136  136 - 145 mmol/L Final    Potassium 01/10/2025 4.8  3.5 - 5.2 mmol/L Final    Slight hemolysis detected by analyzer. Result may be falsely elevated.    Chloride 01/10/2025 100  98 - 107 mmol/L Final    CO2 01/10/2025 26.9  22.0 - 29.0 mmol/L Final    Calcium 01/10/2025 9.7  8.6 - 10.5 mg/dL Final    Total Protein 01/10/2025 7.2  6.0 - 8.5 g/dL Final    Albumin 01/10/2025 4.5  3.5 - 5.2 g/dL Final    ALT (SGPT) 01/10/2025 42 (H)  1 - 41 U/L Final    AST (SGOT) 01/10/2025 32  1 - 40 U/L Final    Alkaline Phosphatase 01/10/2025 98  39 - 117 U/L Final    Total Bilirubin 01/10/2025 0.6  0.0 - 1.2 mg/dL Final    Globulin 01/10/2025 2.7  gm/dL Final    A/G Ratio 01/10/2025 1.7  g/dL Final    BUN/Creatinine Ratio 01/10/2025 11.1  7.0 - 25.0 Final    Anion Gap 01/10/2025 9.1  5.0 - 15.0 mmol/L Final    eGFR 01/10/2025 92.2  >60.0 mL/min/1.73 Final        Radiology Results        Assessment / Plan         Assessment and Plan   Diagnoses and all orders for this visit:    1. Rash (Primary)  Assessment & Plan:  After discussing with the patient, we believe the Flonase is the culprit for the patient's facial rash.  Discontinue use.  Patient given a Solu-Medrol shot in the office to help with ongoing rash and recommend the patient continue to use hydrocortisone cream until the rash clears up.  Return to the clinic or send me a message in 2 to 3 days if the rash does not improve.    Orders:  -     methylPREDNISolone sodium succinate (SOLU-Medrol) injection 125 mg                Health  Maintenance  Health Maintenance:   Health Maintenance Due   Topic Date Due    ZOSTER VACCINE (1 of 2) Never done    COVID-19 Vaccine (4 - 2024-25 season) 09/01/2024        Meds ordered during this visit  New Medications Ordered This Visit   Medications    methylPREDNISolone sodium succinate (SOLU-Medrol) injection 125 mg       Meds stopped during this visit:  Medications Discontinued During This Encounter   Medication Reason    loratadine (CLARITIN) 10 MG tablet *Therapy completed    promethazine-dextromethorphan (PROMETHAZINE-DM) 6.25-15 MG/5ML syrup *Therapy completed    fluticasone (FLONASE) 50 MCG/ACT nasal spray *Therapy completed    colchicine (Mitigare) 0.6 MG capsule capsule *Therapy completed        Patient was given instructions and counseling regarding his condition or for health maintenance advice. Please see specific information pulled into the AVS if appropriate.     Follow Up   No follow-ups on file.    Roz Helton DO  [facility]     Dictated Utilizing Dragon Dictation: Part of this note may be an electronic transcription/translation of spoken language to printed text using the Dragon Dictation System.    This document has been electronically signed by Roz Helton DO   January 20, 2025 11:45 EST

## 2025-04-29 ENCOUNTER — OFFICE VISIT (OUTPATIENT)
Dept: FAMILY MEDICINE CLINIC | Facility: CLINIC | Age: 50
End: 2025-04-29
Payer: COMMERCIAL

## 2025-04-29 VITALS
WEIGHT: 215 LBS | HEIGHT: 73 IN | TEMPERATURE: 98.2 F | BODY MASS INDEX: 28.49 KG/M2 | DIASTOLIC BLOOD PRESSURE: 82 MMHG | HEART RATE: 70 BPM | SYSTOLIC BLOOD PRESSURE: 140 MMHG | OXYGEN SATURATION: 98 %

## 2025-04-29 DIAGNOSIS — I10 PRIMARY HYPERTENSION: Primary | ICD-10-CM

## 2025-04-29 PROBLEM — R21 RASH: Status: RESOLVED | Noted: 2025-01-20 | Resolved: 2025-04-29

## 2025-04-29 PROBLEM — J40 BRONCHITIS: Status: RESOLVED | Noted: 2024-05-18 | Resolved: 2025-04-29

## 2025-04-29 PROBLEM — J01.40 ACUTE NON-RECURRENT PANSINUSITIS: Status: RESOLVED | Noted: 2024-05-18 | Resolved: 2025-04-29

## 2025-04-29 PROCEDURE — 99214 OFFICE O/P EST MOD 30 MIN: CPT | Performed by: FAMILY MEDICINE

## 2025-04-29 RX ORDER — AMLODIPINE BESYLATE 10 MG/1
10 TABLET ORAL DAILY
Qty: 90 TABLET | Refills: 3 | Status: SHIPPED | OUTPATIENT
Start: 2025-04-29

## 2025-04-29 NOTE — PROGRESS NOTES
Deepak Albarran is a 49 y.o. male who presents today for Hypertension      HPI     He has been taking amlodipine and losartan as directed and has tolerated these well. He has been checking blood pressure at home and sees numbers in the 140s/90s but he does not feel like his cuff is accurate. He has been trying to cut back on alcohol intake and it drinking about 20-25 drinks a week. He has been walking for exercise 3.2 miles a day now weather permitting. He has been eating a varied diet but could use improvement.     Review of Systems   Constitutional:  Negative for fever and unexpected weight loss.   HENT:  Negative for congestion, ear pain and sore throat.    Eyes:  Negative for visual disturbance.   Respiratory:  Negative for cough, shortness of breath and wheezing.    Cardiovascular:  Negative for chest pain and palpitations.   Gastrointestinal:  Negative for abdominal pain, blood in stool, constipation, diarrhea, nausea, vomiting and GERD.   Endocrine: Negative for polydipsia and polyuria.   Genitourinary:  Negative for difficulty urinating.   Musculoskeletal:  Negative for joint swelling.   Skin:  Negative for rash and skin lesions.   Allergic/Immunologic: Negative for environmental allergies.   Neurological:  Negative for seizures and syncope.   Hematological:  Does not bruise/bleed easily.   Psychiatric/Behavioral:  Negative for suicidal ideas.         The following portions of the patient's history were reviewed and updated as appropriate: allergies, current medications, past family history, past medical history, past social history, past surgical history and problem list.    Current Outpatient Medications on File Prior to Visit   Medication Sig Dispense Refill    allopurinol (ZYLOPRIM) 300 MG tablet TAKE 1 TABLET BY MOUTH DAILY 90 tablet 3    losartan (COZAAR) 100 MG tablet Take 1 tablet by mouth Daily. 90 tablet 3    [DISCONTINUED] amLODIPine (NORVASC) 5 MG tablet Take 1 tablet by mouth Daily. 90 tablet 3  "   [DISCONTINUED] albuterol sulfate  (90 Base) MCG/ACT inhaler Inhale 2 puffs Every 4 (Four) Hours As Needed for Wheezing or Shortness of Air. (Patient not taking: Reported on 4/29/2025) 8 g 0    [DISCONTINUED] cefuroxime (CEFTIN) 500 MG tablet Take 1 tablet by mouth 2 (Two) Times a Day. (Patient not taking: Reported on 4/29/2025) 20 tablet 0     No current facility-administered medications on file prior to visit.       Allergies   Allergen Reactions    Flonase [Fluticasone] Rash        Visit Vitals  /82 (BP Location: Right arm, Patient Position: Sitting, Cuff Size: Adult)   Pulse 70   Temp 98.2 °F (36.8 °C) (Infrared)   Ht 185.4 cm (73\")   Wt 97.5 kg (215 lb)   SpO2 98%   BMI 28.37 kg/m²        Physical Exam  Constitutional:       General: He is not in acute distress.     Appearance: He is well-developed. He is not diaphoretic.   HENT:      Head: Atraumatic.   Cardiovascular:      Rate and Rhythm: Normal rate and regular rhythm.      Heart sounds: Normal heart sounds. No murmur heard.     No friction rub. No gallop.   Pulmonary:      Effort: Pulmonary effort is normal. No respiratory distress.      Breath sounds: Normal breath sounds. No stridor. No wheezing, rhonchi or rales.   Musculoskeletal:      Cervical back: Normal range of motion and neck supple.   Skin:     General: Skin is warm and dry.   Neurological:      Mental Status: He is alert and oriented to person, place, and time.   Psychiatric:         Behavior: Behavior normal.          Results for orders placed or performed during the hospital encounter of 01/15/25   POC Rapid Strep A    Collection Time: 01/15/25  1:48 PM    Specimen: Swab   Result Value Ref Range    Rapid Strep A Screen Positive (A)     Internal Control Passed     Lot Number [id number]     Expiration Date 12-11-26         Problems Addressed this Visit          Cardiac and Vasculature    HTN (hypertension) - Primary    Hypertension is borderline  Medication changes per " orders.  Blood pressure will be reassessedin 3 months.         Relevant Medications    amLODIPine (NORVASC) 10 MG tablet     Diagnoses         Codes Comments      Primary hypertension    -  Primary ICD-10-CM: I10  ICD-9-CM: 401.9             Return in about 4 months (around 8/15/2025) for Annual.    Nii Child MD   4/29/2025

## 2025-07-02 ENCOUNTER — OFFICE VISIT (OUTPATIENT)
Dept: FAMILY MEDICINE CLINIC | Facility: CLINIC | Age: 50
End: 2025-07-02
Payer: COMMERCIAL

## 2025-07-02 VITALS
WEIGHT: 215.8 LBS | HEART RATE: 92 BPM | OXYGEN SATURATION: 98 % | SYSTOLIC BLOOD PRESSURE: 126 MMHG | BODY MASS INDEX: 28.6 KG/M2 | TEMPERATURE: 98.2 F | DIASTOLIC BLOOD PRESSURE: 86 MMHG | HEIGHT: 73 IN

## 2025-07-02 DIAGNOSIS — M25.572 ACUTE LEFT ANKLE PAIN: ICD-10-CM

## 2025-07-02 DIAGNOSIS — M62.838 MUSCLE SPASM: ICD-10-CM

## 2025-07-02 DIAGNOSIS — M79.662 PAIN AND SWELLING OF LEFT LOWER LEG: ICD-10-CM

## 2025-07-02 DIAGNOSIS — M54.6 PAIN IN THORACIC SPINE: Primary | ICD-10-CM

## 2025-07-02 DIAGNOSIS — M25.472 LEFT ANKLE SWELLING: ICD-10-CM

## 2025-07-02 DIAGNOSIS — M1A.9XX0 CHRONIC GOUT WITHOUT TOPHUS, UNSPECIFIED CAUSE, UNSPECIFIED SITE: ICD-10-CM

## 2025-07-02 DIAGNOSIS — M79.89 PAIN AND SWELLING OF LEFT LOWER LEG: ICD-10-CM

## 2025-07-02 PROCEDURE — 99214 OFFICE O/P EST MOD 30 MIN: CPT | Performed by: FAMILY MEDICINE

## 2025-07-02 RX ORDER — CYCLOBENZAPRINE HCL 5 MG
5 TABLET ORAL 3 TIMES DAILY PRN
Qty: 90 TABLET | Refills: 3 | Status: SHIPPED | OUTPATIENT
Start: 2025-07-02

## 2025-07-02 RX ORDER — COLCHICINE 0.6 MG/1
0.6 TABLET ORAL 2 TIMES DAILY PRN
Qty: 60 TABLET | Refills: 3 | Status: SHIPPED | OUTPATIENT
Start: 2025-07-02

## 2025-07-02 RX ORDER — METHYLPREDNISOLONE 4 MG/1
TABLET ORAL
Qty: 21 TABLET | Refills: 0 | Status: SHIPPED | OUTPATIENT
Start: 2025-07-02 | End: 2025-07-07

## 2025-07-02 NOTE — ASSESSMENT & PLAN NOTE
In the ankle the swelling could be secondary to gout flare but he has had recent increase in activity so there is concern for possible stress fracture in the tibia as well as that is where he had tenderness.  Although DVT is unlikely cannot completely rule this out either.  Order ultrasound for further evaluation of DVT.  X-rays ordered to evaluate for stress fracture.  Patient will continue allopurinol and colchicine.  Patient will hold ibuprofen and can use Tylenol as needed for pain.  He is given a steroid Dosepak to help alleviate symptoms.  RTC/ED precautions given.

## 2025-07-02 NOTE — PROGRESS NOTES
"Deepak Albarran is a 50 y.o. male who presents today for left ankle pain, Foot Swelling, and Back Pain      HPI     Patient states that two weeks ago his left ankle started to swell but he was not having any pain. He had been walking more for exercise when the swelling started. He has not had any injury to the ankle he can remember. Swelling is better in the morning and improves over night. He states the swelling today is the best its been. He began having pain on Friday. He states the pain feels like \"a balloon that's getting ready to bust.\" He has not noticed improvement in pain with improvement in swelling. He has been taking advil but this has not seemed to help. Pain is from the toes to the ankles. He did start taking colchicine he had left over from 2022 and has been taking allopurinol but has not seen much difference with this. He states that he has not noticed anything that makes it better or worse.     He started having back pain up around his left shoulder blade since playing golf on Sunday but did not feel any moment where he felt he injured himself. He denies neck pain and does not have any shoulder pain. He states the pain is on the medial border of the left scapula. He has states sitting and working on the computer make things worse. He states it will also hurt randomly with different movements. Pain does not radiate. Hot shower and ibuprofen have not helped.     Review of Systems   Constitutional:  Negative for fever and unexpected weight loss.   HENT:  Negative for congestion, ear pain and sore throat.    Eyes:  Negative for visual disturbance.   Respiratory:  Negative for cough, shortness of breath and wheezing.    Cardiovascular:  Positive for leg swelling (left foot and ankle sweling.). Negative for chest pain and palpitations.   Gastrointestinal:  Negative for abdominal pain, blood in stool, constipation, diarrhea, nausea, vomiting and GERD.   Endocrine: Negative for polydipsia and polyuria. " "  Genitourinary:  Negative for difficulty urinating.   Musculoskeletal:  Positive for arthralgias (left ankle and foot pain) and back pain. Negative for joint swelling, neck pain and neck stiffness.   Skin:  Negative for rash and skin lesions.   Allergic/Immunologic: Negative for environmental allergies.   Neurological:  Negative for seizures and syncope.   Hematological:  Does not bruise/bleed easily.   Psychiatric/Behavioral:  Negative for suicidal ideas.         The following portions of the patient's history were reviewed and updated as appropriate: allergies, current medications, past family history, past medical history, past social history, past surgical history and problem list.    Current Outpatient Medications on File Prior to Visit   Medication Sig Dispense Refill    allopurinol (ZYLOPRIM) 300 MG tablet TAKE 1 TABLET BY MOUTH DAILY 90 tablet 3    amLODIPine (NORVASC) 10 MG tablet Take 1 tablet by mouth Daily. 90 tablet 3    losartan (COZAAR) 100 MG tablet Take 1 tablet by mouth Daily. 90 tablet 3     No current facility-administered medications on file prior to visit.       Allergies   Allergen Reactions    Flonase [Fluticasone] Rash        Visit Vitals  /86 (BP Location: Left arm, Patient Position: Sitting, Cuff Size: Large Adult)   Pulse 92   Temp 98.2 °F (36.8 °C) (Infrared)   Ht 185.4 cm (72.99\")   Wt 97.9 kg (215 lb 12.8 oz)   SpO2 98%   BMI 28.48 kg/m²        Physical Exam  Constitutional:       General: He is not in acute distress.     Appearance: He is well-developed. He is not diaphoretic.   HENT:      Head: Atraumatic.   Cardiovascular:      Rate and Rhythm: Normal rate and regular rhythm.      Heart sounds: Normal heart sounds. No murmur heard.     No friction rub. No gallop.   Pulmonary:      Effort: Pulmonary effort is normal. No respiratory distress.      Breath sounds: Normal breath sounds. No stridor. No wheezing, rhonchi or rales.   Musculoskeletal:      Right shoulder: Normal.      " Left shoulder: Normal.      Cervical back: Normal, normal range of motion and neck supple.      Thoracic back: Spasms (left side paraspinal muscle) and tenderness (left side paraspinal muscle along medial border of scapula) present. No swelling, edema, deformity, signs of trauma, lacerations or bony tenderness. Normal range of motion.      Lumbar back: Normal.      Left lower leg: Swelling, tenderness and bony tenderness (tenderness over mid shin) present. No deformity or lacerations. Pitting Edema present.      Left ankle: Swelling present. No deformity, ecchymosis or lacerations. No tenderness. Normal range of motion. Anterior drawer test negative. Normal pulse.      Left Achilles Tendon: Normal.      Left foot: Normal range of motion and normal capillary refill. Swelling present. No deformity, bunion, Charcot foot, foot drop, prominent metatarsal heads, laceration, tenderness, bony tenderness or crepitus. Normal pulse.   Skin:     General: Skin is warm and dry.   Neurological:      Mental Status: He is alert and oriented to person, place, and time.   Psychiatric:         Behavior: Behavior normal.          Results for orders placed or performed during the hospital encounter of 01/15/25   POC Rapid Strep A    Collection Time: 01/15/25  1:48 PM    Specimen: Swab   Result Value Ref Range    Rapid Strep A Screen Positive (A)     Internal Control Passed     Lot Number [id number]     Expiration Date 12-11-26         Problems Addressed this Visit          Musculoskeletal and Injuries    Gout    Relevant Medications    colchicine 0.6 MG tablet    Pain in thoracic spine - Primary    I suspect the pain that the patient is having along his shoulder blade on the thoracic spine is likely secondary to muscle spasm.  Patient is given Flexeril to use as needed.  He can return to stretching at home as well.  RTC/ED precautions given.         Relevant Medications    cyclobenzaprine (FLEXERIL) 5 MG tablet    methylPREDNISolone  (MEDROL) 4 MG dose pack    Muscle spasm    Relevant Medications    cyclobenzaprine (FLEXERIL) 5 MG tablet    Acute left ankle pain    Relevant Orders    XR Ankle 3+ View Left    Left ankle swelling    Relevant Medications    methylPREDNISolone (MEDROL) 4 MG dose pack    Other Relevant Orders    XR Ankle 3+ View Left    Pain and swelling of left lower leg    In the ankle the swelling could be secondary to gout flare but he has had recent increase in activity so there is concern for possible stress fracture in the tibia as well as that is where he had tenderness.  Although DVT is unlikely cannot completely rule this out either.  Order ultrasound for further evaluation of DVT.  X-rays ordered to evaluate for stress fracture.  Patient will continue allopurinol and colchicine.  Patient will hold ibuprofen and can use Tylenol as needed for pain.  He is given a steroid Dosepak to help alleviate symptoms.  RTC/ED precautions given.         Relevant Orders    Duplex Venous Lower Extremity - Left CAR    XR Tibia Fibula 2 View Left (In Office)     Diagnoses         Codes Comments      Pain in thoracic spine    -  Primary ICD-10-CM: M54.6  ICD-9-CM: 724.1       Muscle spasm     ICD-10-CM: M62.838  ICD-9-CM: 728.85       Acute left ankle pain     ICD-10-CM: M25.572  ICD-9-CM: 719.47       Left ankle swelling     ICD-10-CM: M25.472  ICD-9-CM: 719.07       Pain and swelling of left lower leg     ICD-10-CM: M79.662, M79.89  ICD-9-CM: 729.5, 729.81       Chronic gout without tophus, unspecified cause, unspecified site     ICD-10-CM: M1A.9XX0  ICD-9-CM: 274.02             Return if symptoms worsen or fail to improve.    30 minutes was spent on this patient encounter which included history taking, physical exam, answering patient questions, counseling, discussing treatment plan, placing orders, and documentation.    Nii Child MD   7/2/2025

## 2025-07-02 NOTE — ASSESSMENT & PLAN NOTE
I suspect the pain that the patient is having along his shoulder blade on the thoracic spine is likely secondary to muscle spasm.  Patient is given Flexeril to use as needed.  He can return to stretching at home as well.  RTC/ED precautions given.

## 2025-07-03 ENCOUNTER — HOSPITAL ENCOUNTER (OUTPATIENT)
Facility: HOSPITAL | Age: 50
Discharge: HOME | End: 2025-07-03
Admitting: FAMILY MEDICINE
Payer: COMMERCIAL

## 2025-07-03 DIAGNOSIS — M79.662 PAIN AND SWELLING OF LEFT LOWER LEG: ICD-10-CM

## 2025-07-03 DIAGNOSIS — M79.89 PAIN AND SWELLING OF LEFT LOWER LEG: ICD-10-CM

## 2025-07-03 LAB
BH CV LOWER VASCULAR LEFT COMMON FEMORAL AUGMENT: NORMAL
BH CV LOWER VASCULAR LEFT COMMON FEMORAL COMPRESS: NORMAL
BH CV LOWER VASCULAR LEFT COMMON FEMORAL PHASIC: NORMAL
BH CV LOWER VASCULAR LEFT COMMON FEMORAL SPONT: NORMAL
BH CV LOWER VASCULAR LEFT DISTAL FEMORAL AUGMENT: NORMAL
BH CV LOWER VASCULAR LEFT DISTAL FEMORAL COMPRESS: NORMAL
BH CV LOWER VASCULAR LEFT DISTAL FEMORAL PHASIC: NORMAL
BH CV LOWER VASCULAR LEFT DISTAL FEMORAL SPONT: NORMAL
BH CV LOWER VASCULAR LEFT GASTRONEMIUS COMPRESS: NORMAL
BH CV LOWER VASCULAR LEFT GREATER SAPH AK COMPRESS: NORMAL
BH CV LOWER VASCULAR LEFT GREATER SAPH BK COMPRESS: NORMAL
BH CV LOWER VASCULAR LEFT LESSER SAPH COMPRESS: NORMAL
BH CV LOWER VASCULAR LEFT MID FEMORAL AUGMENT: NORMAL
BH CV LOWER VASCULAR LEFT MID FEMORAL COMPRESS: NORMAL
BH CV LOWER VASCULAR LEFT MID FEMORAL PHASIC: NORMAL
BH CV LOWER VASCULAR LEFT MID FEMORAL SPONT: NORMAL
BH CV LOWER VASCULAR LEFT PERONEAL COMPRESS: NORMAL
BH CV LOWER VASCULAR LEFT POPLITEAL AUGMENT: NORMAL
BH CV LOWER VASCULAR LEFT POPLITEAL COMPRESS: NORMAL
BH CV LOWER VASCULAR LEFT POPLITEAL PHASIC: NORMAL
BH CV LOWER VASCULAR LEFT POPLITEAL SPONT: NORMAL
BH CV LOWER VASCULAR LEFT POSTERIOR TIBIAL COMPRESS: NORMAL
BH CV LOWER VASCULAR LEFT PROFUNDA FEMORAL COMPRESS: NORMAL
BH CV LOWER VASCULAR LEFT PROXIMAL FEMORAL AUGMENT: NORMAL
BH CV LOWER VASCULAR LEFT PROXIMAL FEMORAL COMPRESS: NORMAL
BH CV LOWER VASCULAR LEFT PROXIMAL FEMORAL PHASIC: NORMAL
BH CV LOWER VASCULAR LEFT PROXIMAL FEMORAL SPONT: NORMAL
BH CV LOWER VASCULAR LEFT SAPHENOFEMORAL JUNCTION AUGMENT: NORMAL
BH CV LOWER VASCULAR LEFT SAPHENOFEMORAL JUNCTION COMPRESS: NORMAL
BH CV LOWER VASCULAR LEFT SAPHENOFEMORAL JUNCTION PHASIC: NORMAL
BH CV LOWER VASCULAR LEFT SAPHENOFEMORAL JUNCTION SPONT: NORMAL
BH CV LOWER VASCULAR RIGHT COMMON FEMORAL AUGMENT: NORMAL
BH CV LOWER VASCULAR RIGHT COMMON FEMORAL PHASIC: NORMAL
BH CV LOWER VASCULAR RIGHT COMMON FEMORAL SPONT: NORMAL

## 2025-07-03 PROCEDURE — 93971 EXTREMITY STUDY: CPT

## 2025-07-03 PROCEDURE — 93971 EXTREMITY STUDY: CPT | Performed by: INTERNAL MEDICINE

## 2025-07-15 ENCOUNTER — APPOINTMENT (OUTPATIENT)
Facility: HOSPITAL | Age: 50
End: 2025-07-15
Payer: COMMERCIAL

## 2025-07-15 ENCOUNTER — OFFICE VISIT (OUTPATIENT)
Dept: FAMILY MEDICINE CLINIC | Facility: CLINIC | Age: 50
End: 2025-07-15
Payer: COMMERCIAL

## 2025-07-15 ENCOUNTER — HOSPITAL ENCOUNTER (EMERGENCY)
Facility: HOSPITAL | Age: 50
Discharge: HOME | End: 2025-07-15
Attending: EMERGENCY MEDICINE | Admitting: EMERGENCY MEDICINE
Payer: COMMERCIAL

## 2025-07-15 VITALS
OXYGEN SATURATION: 100 % | BODY MASS INDEX: 28.67 KG/M2 | WEIGHT: 216.3 LBS | RESPIRATION RATE: 20 BRPM | SYSTOLIC BLOOD PRESSURE: 132 MMHG | TEMPERATURE: 97.6 F | DIASTOLIC BLOOD PRESSURE: 89 MMHG | HEIGHT: 73 IN | HEART RATE: 73 BPM

## 2025-07-15 VITALS
SYSTOLIC BLOOD PRESSURE: 124 MMHG | HEART RATE: 88 BPM | BODY MASS INDEX: 28.39 KG/M2 | WEIGHT: 214.2 LBS | DIASTOLIC BLOOD PRESSURE: 84 MMHG | OXYGEN SATURATION: 96 % | TEMPERATURE: 98.6 F | HEIGHT: 73 IN

## 2025-07-15 DIAGNOSIS — R07.9 CHEST PAIN, UNSPECIFIED TYPE: Primary | ICD-10-CM

## 2025-07-15 DIAGNOSIS — R07.89 LEFT-SIDED CHEST WALL PAIN: Primary | ICD-10-CM

## 2025-07-15 DIAGNOSIS — R10.9 ABDOMINAL PAIN, UNSPECIFIED ABDOMINAL LOCATION: ICD-10-CM

## 2025-07-15 DIAGNOSIS — M25.572 ACUTE LEFT ANKLE PAIN: ICD-10-CM

## 2025-07-15 DIAGNOSIS — M54.6 PAIN IN THORACIC SPINE: ICD-10-CM

## 2025-07-15 DIAGNOSIS — R07.81 RIB PAIN ON LEFT SIDE: ICD-10-CM

## 2025-07-15 LAB
ALBUMIN SERPL-MCNC: 4.5 G/DL (ref 3.5–5.2)
ALBUMIN/GLOB SERPL: 1.7 G/DL
ALP SERPL-CCNC: 100 U/L (ref 39–117)
ALT SERPL W P-5'-P-CCNC: 50 U/L (ref 1–41)
ANION GAP SERPL CALCULATED.3IONS-SCNC: 10.6 MMOL/L (ref 5–15)
AST SERPL-CCNC: 28 U/L (ref 1–40)
BASOPHILS # BLD AUTO: 0.03 10*3/MM3 (ref 0–0.2)
BASOPHILS NFR BLD AUTO: 0.7 % (ref 0–1.5)
BILIRUB SERPL-MCNC: 0.8 MG/DL (ref 0–1.2)
BUN SERPL-MCNC: 13.5 MG/DL (ref 6–20)
BUN/CREAT SERPL: 14.2 (ref 7–25)
CALCIUM SPEC-SCNC: 9.9 MG/DL (ref 8.6–10.5)
CHLORIDE SERPL-SCNC: 98 MMOL/L (ref 98–107)
CO2 SERPL-SCNC: 26.4 MMOL/L (ref 22–29)
CREAT SERPL-MCNC: 0.95 MG/DL (ref 0.76–1.27)
DEPRECATED RDW RBC AUTO: 41.2 FL (ref 37–54)
EGFRCR SERPLBLD CKD-EPI 2021: 96.3 ML/MIN/1.73
EOSINOPHIL # BLD AUTO: 0.06 10*3/MM3 (ref 0–0.4)
EOSINOPHIL NFR BLD AUTO: 1.3 % (ref 0.3–6.2)
ERYTHROCYTE [DISTWIDTH] IN BLOOD BY AUTOMATED COUNT: 11.8 % (ref 12.3–15.4)
ETHANOL BLD-MCNC: <10 MG/DL (ref 0–10)
GEN 5 1HR TROPONIN T REFLEX: <6 NG/L
GLOBULIN UR ELPH-MCNC: 2.6 GM/DL
GLUCOSE SERPL-MCNC: 101 MG/DL (ref 65–99)
HCT VFR BLD AUTO: 50.4 % (ref 37.5–51)
HGB BLD-MCNC: 18.3 G/DL (ref 13–17.7)
HOLD SPECIMEN: NORMAL
IMM GRANULOCYTES # BLD AUTO: 0.01 10*3/MM3 (ref 0–0.05)
IMM GRANULOCYTES NFR BLD AUTO: 0.2 % (ref 0–0.5)
LIPASE SERPL-CCNC: 29 U/L (ref 13–60)
LYMPHOCYTES # BLD AUTO: 1.46 10*3/MM3 (ref 0.7–3.1)
LYMPHOCYTES NFR BLD AUTO: 32 % (ref 19.6–45.3)
MCH RBC QN AUTO: 33.6 PG (ref 26.6–33)
MCHC RBC AUTO-ENTMCNC: 36.3 G/DL (ref 31.5–35.7)
MCV RBC AUTO: 92.6 FL (ref 79–97)
MONOCYTES # BLD AUTO: 0.48 10*3/MM3 (ref 0.1–0.9)
MONOCYTES NFR BLD AUTO: 10.5 % (ref 5–12)
NEUTROPHILS NFR BLD AUTO: 2.52 10*3/MM3 (ref 1.7–7)
NEUTROPHILS NFR BLD AUTO: 55.3 % (ref 42.7–76)
NT-PROBNP SERPL-MCNC: <36 PG/ML (ref 0–900)
PLATELET # BLD AUTO: 137 10*3/MM3 (ref 140–450)
PMV BLD AUTO: 9.3 FL (ref 6–12)
POTASSIUM SERPL-SCNC: 4.3 MMOL/L (ref 3.5–5.2)
PROT SERPL-MCNC: 7.1 G/DL (ref 6–8.5)
RBC # BLD AUTO: 5.44 10*6/MM3 (ref 4.14–5.8)
SODIUM SERPL-SCNC: 135 MMOL/L (ref 136–145)
TROPONIN T NUMERIC DELTA: NORMAL
TROPONIN T SERPL HS-MCNC: <6 NG/L
WBC NRBC COR # BLD AUTO: 4.56 10*3/MM3 (ref 3.4–10.8)
WHOLE BLOOD HOLD COAG: NORMAL
WHOLE BLOOD HOLD SPECIMEN: NORMAL

## 2025-07-15 PROCEDURE — 85025 COMPLETE CBC W/AUTO DIFF WBC: CPT | Performed by: EMERGENCY MEDICINE

## 2025-07-15 PROCEDURE — 96374 THER/PROPH/DIAG INJ IV PUSH: CPT

## 2025-07-15 PROCEDURE — 74177 CT ABD & PELVIS W/CONTRAST: CPT

## 2025-07-15 PROCEDURE — 25010000002 KETOROLAC TROMETHAMINE PER 15 MG: Performed by: PHYSICIAN ASSISTANT

## 2025-07-15 PROCEDURE — 36415 COLL VENOUS BLD VENIPUNCTURE: CPT

## 2025-07-15 PROCEDURE — 83690 ASSAY OF LIPASE: CPT | Performed by: EMERGENCY MEDICINE

## 2025-07-15 PROCEDURE — 71275 CT ANGIOGRAPHY CHEST: CPT

## 2025-07-15 PROCEDURE — 25510000001 IOPAMIDOL PER 1 ML: Performed by: EMERGENCY MEDICINE

## 2025-07-15 PROCEDURE — 82077 ASSAY SPEC XCP UR&BREATH IA: CPT | Performed by: PHYSICIAN ASSISTANT

## 2025-07-15 PROCEDURE — 84484 ASSAY OF TROPONIN QUANT: CPT | Performed by: EMERGENCY MEDICINE

## 2025-07-15 PROCEDURE — 93005 ELECTROCARDIOGRAM TRACING: CPT | Performed by: EMERGENCY MEDICINE

## 2025-07-15 PROCEDURE — 83880 ASSAY OF NATRIURETIC PEPTIDE: CPT | Performed by: EMERGENCY MEDICINE

## 2025-07-15 PROCEDURE — 80053 COMPREHEN METABOLIC PANEL: CPT | Performed by: EMERGENCY MEDICINE

## 2025-07-15 PROCEDURE — 99285 EMERGENCY DEPT VISIT HI MDM: CPT | Performed by: EMERGENCY MEDICINE

## 2025-07-15 PROCEDURE — 71045 X-RAY EXAM CHEST 1 VIEW: CPT

## 2025-07-15 RX ORDER — MELOXICAM 15 MG/1
15 TABLET ORAL DAILY
Qty: 15 TABLET | Refills: 0 | Status: SHIPPED | OUTPATIENT
Start: 2025-07-15

## 2025-07-15 RX ORDER — LIDOCAINE 50 MG/G
1 PATCH TOPICAL EVERY 24 HOURS
Qty: 30 PATCH | Refills: 0 | Status: SHIPPED | OUTPATIENT
Start: 2025-07-15

## 2025-07-15 RX ORDER — KETOROLAC TROMETHAMINE 15 MG/ML
15 INJECTION, SOLUTION INTRAMUSCULAR; INTRAVENOUS ONCE
Status: COMPLETED | OUTPATIENT
Start: 2025-07-15 | End: 2025-07-15

## 2025-07-15 RX ORDER — SODIUM CHLORIDE 0.9 % (FLUSH) 0.9 %
10 SYRINGE (ML) INJECTION AS NEEDED
Status: DISCONTINUED | OUTPATIENT
Start: 2025-07-15 | End: 2025-07-15 | Stop reason: HOSPADM

## 2025-07-15 RX ORDER — LIDOCAINE 4 G/G
1 PATCH TOPICAL
Status: DISCONTINUED | OUTPATIENT
Start: 2025-07-15 | End: 2025-07-15 | Stop reason: HOSPADM

## 2025-07-15 RX ORDER — IOPAMIDOL 755 MG/ML
100 INJECTION, SOLUTION INTRAVASCULAR
Status: COMPLETED | OUTPATIENT
Start: 2025-07-15 | End: 2025-07-15

## 2025-07-15 RX ORDER — ASPIRIN 81 MG/1
324 TABLET, CHEWABLE ORAL ONCE
Status: DISCONTINUED | OUTPATIENT
Start: 2025-07-15 | End: 2025-07-15 | Stop reason: HOSPADM

## 2025-07-15 RX ADMIN — KETOROLAC TROMETHAMINE 15 MG: 15 INJECTION INTRAMUSCULAR at 19:59

## 2025-07-15 RX ADMIN — LIDOCAINE 1 PATCH: 4 PATCH TOPICAL at 18:01

## 2025-07-15 RX ADMIN — IOPAMIDOL 85 ML: 755 INJECTION, SOLUTION INTRAVENOUS at 19:04

## 2025-07-15 NOTE — FSED PROVIDER NOTE
[place]    EMERGENCY DEPARTMENT ENCOUNTER      Pt Name: Deepak Albarran  MRN: [id number]  YOB: 1975  Date of evaluation: 7/15/2025  Provider: Padma Munoz PA-C    CHIEF COMPLAINT       Chief Complaint   Patient presents with    Chest Pain       HISTORY OF PRESENT ILLNESS  (Location/Symptom, Timing/Onset, Context/Setting, Quality, Duration, Modifying Factors, Severity.)   Deepak Albarran is a 50 y.o. male who presents to the emergency department from primary care provider with left-sided chest pain.  He recently returned from a golf trip and he states that he was playing golf a lot more and originally attributed to the pain in his back and shoulder from golfing.  Over the last week he has had progressively worsening left back pain and it now radiates into his left lateral chest and left anterior chest wall.  It is reproducible with inspiration, movement, palpation.  Denies cough, congestion, fever, chills, fall, injury, trauma.  The pain is also in his left upper abdomen and it is tender to palpation.  He has a past medical history of hypertension on losartan and Norvasc.  He is a former tobacco user and has a history of hyperlipidemia.    \[facility]\""   Nursing notes were reviewed.  REVIEW OF SYSTEMS    (2-9 systems for level 4, 10 or more for level 5)   Review of Systems   Cardiovascular:  Positive for chest pain.   All other systems reviewed and are negative.      All systems reviewed and negative except for those discussed in HPI.   PAST MEDICAL HISTORY     Past Medical History:   Diagnosis Date    Gout     HTN (hypertension)     Low back pain        SURGICAL HISTORY       Past Surgical History:   Procedure Laterality Date    LASIK Bilateral 2004    LIPOMA RESECTION      WISDOM TOOTH EXTRACTION  1997       CURRENT MEDICATIONS       Current Facility-Administered Medications:     aspirin chewable tablet 324 mg, 324 mg, Oral, Once, Trenton Valente, DO    Lidocaine 4 % 1 patch, 1 patch, Transdermal,  Q24H, Padma Munoz PA-C, 1 patch at 07/15/25 1801    sodium chloride 0.9 % flush 10 mL, 10 mL, Intravenous, PRN, Trenton Valente,     Current Outpatient Medications:     allopurinol (ZYLOPRIM) 300 MG tablet, TAKE 1 TABLET BY MOUTH DAILY, Disp: 90 tablet, Rfl: 3    amLODIPine (NORVASC) 10 MG tablet, Take 1 tablet by mouth Daily., Disp: 90 tablet, Rfl: 3    colchicine 0.6 MG tablet, Take 1 tablet by mouth 2 (Two) Times a Day As Needed for Muscle / Joint Pain., Disp: 60 tablet, Rfl: 3    cyclobenzaprine (FLEXERIL) 5 MG tablet, Take 1 tablet by mouth 3 (Three) Times a Day As Needed for Muscle Spasms., Disp: 90 tablet, Rfl: 3    lidocaine (LIDODERM) 5 %, Place 1 patch on the skin as directed by provider Daily. Remove & Discard patch within 12 hours or as directed by MD, Disp: 30 patch, Rfl: 0    losartan (COZAAR) 100 MG tablet, Take 1 tablet by mouth Daily., Disp: 90 tablet, Rfl: 3    meloxicam (MOBIC) 15 MG tablet, Take 1 tablet by mouth Daily., Disp: 15 tablet, Rfl: 0    ALLERGIES     Flonase [fluticasone]    FAMILY HISTORY       Family History   Problem Relation Age of Onset    Stroke Mother     Osteoarthritis Father     Hearing loss Father     Arthritis Father     No Known Problems Sister     Leukemia Maternal Grandmother     Lung cancer Paternal Grandmother     Epilepsy Son         SOCIAL HISTORY       Social History     Socioeconomic History    Marital status: Single     Spouse name: N/A    Number of children: 1    Years of education: College    Highest education level: Bachelor's degree (e.g., BA, AB, BS)   Tobacco Use    Smoking status: Former     Current packs/day: 0.00     Average packs/day: 1 pack/day for 10.5 years (10.5 ttl pk-yrs)     Types: Cigarettes     Start date: 1993     Quit date: 2003     Years since quittin.0    Smokeless tobacco: Current     Types: Snuff   Vaping Use    Vaping status: Never Used   Substance and Sexual Activity    Alcohol use: Yes     Alcohol/week: 17.0  standard drinks of alcohol     Types: 5 Cans of beer, 12 Shots of liquor per week    Drug use: No    Sexual activity: Yes     Partners: Female     Birth control/protection: Condom, Post-menopausal     Comment: 2 female sexual partners in the last year       PHYSICAL EXAM    (up to 7 for level 4, 8 or more for level 5)   Physical Exam  Vitals and nursing note reviewed.   Constitutional:       Appearance: He is well-developed. He is obese.   HENT:      Head: Normocephalic and atraumatic.      Right Ear: External ear normal.      Left Ear: External ear normal.      Nose: Nose normal.      Mouth/Throat:      Mouth: Mucous membranes are moist.   Eyes:      Extraocular Movements: Extraocular movements intact.      Conjunctiva/sclera: Conjunctivae normal.   Cardiovascular:      Rate and Rhythm: Normal rate and regular rhythm.      Pulses: Normal pulses.      Heart sounds: Normal heart sounds.   Pulmonary:      Effort: Pulmonary effort is normal. No respiratory distress.      Breath sounds: Normal breath sounds.   Chest:      Chest wall: Tenderness (Left anterior, lateral, posterior chest wall tenderness) present.   Abdominal:      Palpations: Abdomen is soft.      Tenderness: There is abdominal tenderness (Left upper quadrant).   Musculoskeletal:         General: Normal range of motion.      Cervical back: Normal range of motion and neck supple.      Right lower leg: No tenderness.   Skin:     General: Skin is warm and dry.      Capillary Refill: Capillary refill takes less than 2 seconds.   Neurological:      General: No focal deficit present.      Mental Status: He is alert and oriented to person, place, and time.   Psychiatric:         Mood and Affect: Mood normal.         Behavior: Behavior normal.          DIAGNOSTIC RESULTS     EKG: All EKGs are interpreted by the Emergency Department Physician who either signs or Co-signs this chart in the absence of a cardiologist.    Telemetry Scan   Final Result      ECG 12 Lead  Chest Pain   Preliminary Result   Test Reason : Chest Pain   Blood Pressure :   */*   mmHG   Vent. Rate :  63 BPM     Atrial Rate :  63 BPM      P-R Int : 184 ms          QRS Dur :  88 ms       QT Int : 396 ms       P-R-T Axes :  11  10  19 degrees     QTcB Int : 405 ms      Normal sinus rhythm   Normal ECG   When compared with ECG of 15-Jul-2025 17:18, (Unconfirmed)   No significant change was found      Referred By:            Confirmed By:       Telemetry Scan   Final Result      ECG 12 Lead Chest Pain   Preliminary Result   Test Reason : Chest Pain   Blood Pressure :   */*   mmHG   Vent. Rate :  77 BPM     Atrial Rate :  77 BPM      P-R Int : 200 ms          QRS Dur :  90 ms       QT Int : 356 ms       P-R-T Axes :  41   4  19 degrees     QTcB Int : 402 ms      Normal sinus rhythm   Normal ECG   No previous ECGs available      Referred By:            Confirmed By:           RADIOLOGY:   Non-plain film images such as CT, Ultrasound and MRI are read by the radiologist. Plain radiographic images are visualized and preliminarily interpreted by the emergency physician with the below findings:    [x] Radiologist's Report Reviewed:  CT Abdomen Pelvis With Contrast   Final Result   Impression:   No evidence of pulmonary embolism.   No acute findings in the chest, abdomen, or pelvis.            Electronically Signed: Shaan Pittman MD     7/15/2025 7:33 PM EDT     Workstation ID: [id number]      CT Angiogram Chest Pulmonary Embolism   Final Result   Impression:   No evidence of pulmonary embolism.   No acute findings in the chest, abdomen, or pelvis.            Electronically Signed: Shaan Pittman MD     7/15/2025 7:33 PM EDT     Workstation ID: [id number]      XR Chest 1 View   Final Result   Impression:   No acute cardiopulmonary abnormality.            Electronically Signed: Antonio Rico MD     7/15/2025 5:36 PM EDT     Workstation ID: [id number]          ED BEDSIDE ULTRASOUND:   Performed by ED Physician -  none    LABS:    I have reviewed and interpreted all of the currently available lab results from this visit (if applicable):  Results for orders placed or performed during the hospital encounter of 07/15/25   ECG 12 Lead Chest Pain    Collection Time: 07/15/25  5:18 PM   Result Value Ref Range    QT Interval 356 ms    QTC Interval 402 ms   High Sensitivity Troponin T    Collection Time: 07/15/25  5:23 PM    Specimen: Blood   Result Value Ref Range    HS Troponin T <6 <22 ng/L   Comprehensive Metabolic Panel    Collection Time: 07/15/25  5:23 PM    Specimen: Blood   Result Value Ref Range    Glucose 101 (H) 65 - 99 mg/dL    BUN 13.5 6.0 - 20.0 mg/dL    Creatinine 0.95 0.76 - 1.27 mg/dL    Sodium 135 (L) 136 - 145 mmol/L    Potassium 4.3 3.5 - 5.2 mmol/L    Chloride 98 98 - 107 mmol/L    CO2 26.4 22.0 - 29.0 mmol/L    Calcium 9.9 8.6 - 10.5 mg/dL    Total Protein 7.1 6.0 - 8.5 g/dL    Albumin 4.5 3.5 - 5.2 g/dL    ALT (SGPT) 50 (H) 1 - 41 U/L    AST (SGOT) 28 1 - 40 U/L    Alkaline Phosphatase 100 39 - 117 U/L    Total Bilirubin 0.8 0.0 - 1.2 mg/dL    Globulin 2.6 gm/dL    A/G Ratio 1.7 g/dL    BUN/Creatinine Ratio 14.2 7.0 - 25.0    Anion Gap 10.6 5.0 - 15.0 mmol/L    eGFR 96.3 >60.0 mL/min/1.73   Lipase    Collection Time: 07/15/25  5:23 PM    Specimen: Blood   Result Value Ref Range    Lipase 29 13 - 60 U/L   BNP    Collection Time: 07/15/25  5:23 PM    Specimen: Blood   Result Value Ref Range    proBNP <36.0 0.0 - 900.0 pg/mL   CBC Auto Differential    Collection Time: 07/15/25  5:23 PM    Specimen: Blood   Result Value Ref Range    WBC 4.56 3.40 - 10.80 10*3/mm3    RBC 5.44 4.14 - 5.80 10*6/mm3    Hemoglobin 18.3 (H) 13.0 - 17.7 g/dL    Hematocrit 50.4 37.5 - 51.0 %    MCV 92.6 79.0 - 97.0 fL    MCH 33.6 (H) 26.6 - 33.0 pg    MCHC 36.3 (H) 31.5 - 35.7 g/dL    RDW 11.8 (L) 12.3 - 15.4 %    RDW-SD 41.2 37.0 - 54.0 fl    MPV 9.3 6.0 - 12.0 fL    Platelets 137 (L) 140 - 450 10*3/mm3    Neutrophil % 55.3 42.7 - 76.0 %     Lymphocyte % 32.0 19.6 - 45.3 %    Monocyte % 10.5 5.0 - 12.0 %    Eosinophil % 1.3 0.3 - 6.2 %    Basophil % 0.7 0.0 - 1.5 %    Immature Grans % 0.2 0.0 - 0.5 %    Neutrophils, Absolute 2.52 1.70 - 7.00 10*3/mm3    Lymphocytes, Absolute 1.46 0.70 - 3.10 10*3/mm3    Monocytes, Absolute 0.48 0.10 - 0.90 10*3/mm3    Eosinophils, Absolute 0.06 0.00 - 0.40 10*3/mm3    Basophils, Absolute 0.03 0.00 - 0.20 10*3/mm3    Immature Grans, Absolute 0.01 0.00 - 0.05 10*3/mm3   Ethanol    Collection Time: 07/15/25  5:23 PM    Specimen: Blood   Result Value Ref Range    Ethanol <10 0 - 10 mg/dL   Green Top (Gel)    Collection Time: 07/15/25  5:23 PM   Result Value Ref Range    Extra Tube Hold for add-ons.    Lavender Top    Collection Time: 07/15/25  5:23 PM   Result Value Ref Range    Extra Tube hold for add-on    Gold Top - SST    Collection Time: 07/15/25  5:23 PM   Result Value Ref Range    Extra Tube Hold for add-ons.    Gray Top    Collection Time: 07/15/25  5:23 PM   Result Value Ref Range    Extra Tube Hold for add-ons.    Light Blue Top    Collection Time: 07/15/25  5:23 PM   Result Value Ref Range    Extra Tube Hold for add-ons.    High Sensitivity Troponin T 1Hr    Collection Time: 07/15/25  6:41 PM    Specimen: Blood   Result Value Ref Range    HS Troponin T <6 <22 ng/L    Troponin T Numeric Delta     ECG 12 Lead Chest Pain    Collection Time: 07/15/25  6:44 PM   Result Value Ref Range    QT Interval 396 ms    QTC Interval 405 ms        All other labs were within normal range or not returned as of this dictation.    EMERGENCY DEPARTMENT COURSE and DIFFERENTIAL DIAGNOSIS/MDM:   Vitals:    Vitals:    07/15/25 1800 07/15/25 1830 07/15/25 1930 07/15/25 2000   BP: (!) 143/101 (!) 141/103 124/73 132/89   Pulse: 73 81 66 73   Resp:       Temp:       TempSrc:       SpO2: 100% 100% 99% 100%   Weight:       Height:           ED Course as of 07/15/25 2204   Tue Jul 15, 2025   1951 Patient presents emergency department chest  pain.  Patient was sent in by his primary care physician.  The pain is reproducible with movement.  Case is consistent with musculoskeletal etiology.   [MODESTO]   1952 Independently reviewed and interpreted the patient's CT which demonstrates calcified granulomas in the spleen. [MODESTO]   1953 The patient was given anticipatory guidance and return precautions and advised of the need for urgent follow up.  [MODESTO]      ED Course User Index  [MODESTO] Jeovany Cuevas MD       Patient is a labs drawn.  Imaging studies revealed no acute rib fracture or traumatic injury.  Cardiac enzymes were negative and EKG was normal sinus rhythm without acute ST elevation.  Patient felt better after the administration of Toradol and application of a lidocaine patch.  Incentive spirometer instructions were given and he was instructed to return for any new or worsening symptoms.    DIFFERENTIAL DIAGNOSIS    Differential diagnosis considered in the evaluation and treatment of patient include but not limited to: MI, chest wall tenderness, pericarditis, pulmonary embolism, intra-abdominal injury, pancreatitis    MDM       I had a discussion with the patient/family regarding diagnosis, diagnostic results, treatment plan, and medications.  The patient/family indicated understanding of these instructions.  I spent adequate time at the bedside preceding discharge necessary to personally discuss the aftercare instructions, giving patient education, providing explanations of the results of our evaluations/findings, and my decision making to assure that the patient/family understand the plan of care.  Time was allotted to answer questions at that time and throughout the ED course.  Emphasis was placed on timely follow-up after discharge.  I also discussed the potential for the development of an acute emergent condition requiring further evaluation, admission, or even surgical intervention. I discussed that we found nothing during the visit today indicating the  need for further workup, admission, or the presence of an unstable medical condition.  I encouraged the patient to return to the emergency department immediately for ANY concerns, worsening, new complaints, or if symptoms persist and unable to seek follow-up in a timely fashion.  The patient/family expressed understanding and agreement with this plan.  The patient will follow-up with primary care provider for reevaluation.     MEDICATIONS ADMINISTERED IN ED:  Medications   sodium chloride 0.9 % flush 10 mL (has no administration in time range)   aspirin chewable tablet 324 mg (324 mg Oral Not Given 7/15/25 1735)   Lidocaine 4 % 1 patch (1 patch Transdermal Medication Applied 7/15/25 1801)   iopamidol (ISOVUE-370) 76 % injection 100 mL (85 mL Intravenous Given 7/15/25 1904)   ketorolac (TORADOL) injection 15 mg (15 mg Intravenous Given 7/15/25 1959)       PROCEDURES:  Procedures          CRITICAL CARE TIME    Total Critical Care time was 0 minutes, excluding separately reportable procedures.   There was a high probability of clinically significant/life threatening deterioration in the patient's condition which required my urgent intervention.    FINAL IMPRESSION      1. Chest pain, unspecified type    2. Abdominal pain, unspecified abdominal location          DISPOSITION/PLAN     ED Disposition       ED Disposition   Discharge    Condition   Stable    Comment   --               PATIENT REFERRED TO:  Nii Child MD  [address]  [place] [address]  [phone]    Schedule an appointment as soon as possible for a visit       [facility] EMERGENCY DEPARTMENT [place]  [address]  [facility] [address] [phone]  Go to   As needed, If symptoms worsen      DISCHARGE MEDICATIONS:     Medication List        START taking these medications      lidocaine 5 %  Commonly known as: LIDODERM  Place 1 patch on the skin as directed by provider Daily. Remove &  Discard patch within 12 hours or as directed by MD     meloxicam 15 MG tablet  Commonly known as: MOBIC  Take 1 tablet by mouth Daily.            CONTINUE taking these medications      allopurinol 300 MG tablet  Commonly known as: ZYLOPRIM  TAKE 1 TABLET BY MOUTH DAILY     amLODIPine 10 MG tablet  Commonly known as: NORVASC  Take 1 tablet by mouth Daily.     colchicine 0.6 MG tablet  Take 1 tablet by mouth 2 (Two) Times a Day As Needed for Muscle / Joint Pain.     cyclobenzaprine 5 MG tablet  Commonly known as: FLEXERIL  Take 1 tablet by mouth 3 (Three) Times a Day As Needed for Muscle Spasms.     losartan 100 MG tablet  Commonly known as: COZAAR  Take 1 tablet by mouth Daily.               Where to Get Your Medications        These medications were sent to [facility] [id number] - [facility] [address] [place] [address] AT [facility] - [phone]  - [phone] [address] [address]      Phone: [phone]   lidocaine 5 %  meloxicam 15 MG tablet         Comment: Please note this report has been produced using speech recognition software.      Padma Munoz PA-C

## 2025-07-15 NOTE — Clinical Note
[facility] EMERGENCY DEPARTMENT [place]  [address]   [facility] [address]  Phone: [phone]  Fax: [phone]    Deepak Albarran was seen and treated in our emergency department on 7/15/2025.  He may return to work on 07/17/2025.         Thank you for choosing [facility].    Jeovany Cuevas MD

## 2025-07-15 NOTE — PROGRESS NOTES
Subjective   Deepak Albarran is a 50 y.o. male.     History of Present Illness  The patient is a 50-year-old male who presents today for pain in his back and ribs. He was seen by his PCP, Dr. Clancy, for similar pain 12 to 13 days ago. At that time, he was experiencing pain in his shoulder blade after playing golf. He was prescribed a steroid and muscle relaxer, but the pain has not improved. He was advised to go to the ER or return to the office if he developed any chest pain or shortness of breath. He was also experiencing pain in his ankle at that time and had a venous duplex of his left lower extremity, which was normal.    The ankle pain that he was having seems to have gotten better however the rib pain has radiated on around his left shoulder blade underneath his arm and around into his chest sharp shooting pains worse with movement worse with deep breaths  Painful with palpation painful with standing seems to be a little alleviated with laying flat    Pain seems to have radiated around from the left shoulder blade around to the front and into the abdomen and central lower rib area he is able to reproduce the pain with deep breaths and palpation in the rib area he is worried he may have broken a rib or cracked a rib but he is not having any shortness of breath.    Cardiovascular risk factors is hypertension he is currently on losartan 100 mg daily and Norvasc 10 mg daily.  He also has a history of tobacco abuse.    I have reviewed his labs from 11 months ago and was total cholesterol is 190 triglycerides 156 HDL 39     Two weeks ago, he consulted Dr. Clancy due to a swollen ankle and back pain following three consecutive days of golf. His left ankle was suspected to have gout, but both x-ray and venous duplex results were negative. The swelling has since subsided. However, he continues to experience severe back pain, which has now spread around his rib cage. He reports occasional shortness of breath  but no family history of heart disease. He is currently on two medications for hypertension. He quit smoking 10 years ago. The pain does not radiate down his arm but is localized and can be reproduced by touch. He reports no presence of rashes or bruising. He speculates that he may have torn cartilage or cracked a rib during golf. 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 He has been taking a muscle relaxer to aid sleep for the past few hours.    Occupations: Works from home  Hobbies: Golf  Tobacco: Quit smoking 10 years ago    FAMILY HISTORY  He does not have a family history of heart disease.      The following portions of the patient's history were reviewed and updated as appropriate: allergies, current medications, past family history, past medical history, past social history, past surgical history, and problem list.    Review of Systems   Constitutional:  Negative for chills, fatigue, fever and unexpected weight change.   HENT:  Negative for congestion, ear pain, nosebleeds, rhinorrhea, sinus pressure, sneezing, sore throat and trouble swallowing.    Eyes:  Negative for itching and visual disturbance.   Respiratory:  Positive for chest tightness. Negative for cough, shortness of breath and wheezing.    Cardiovascular:  Positive for chest pain and leg swelling. Negative for palpitations.   Gastrointestinal:  Negative for abdominal pain, anal bleeding, blood in stool, constipation, diarrhea, nausea and vomiting.   Endocrine: Negative for cold intolerance, heat intolerance, polydipsia and polyuria.   Genitourinary:  Negative for difficulty urinating, frequency, hematuria and urgency.   Musculoskeletal:  Negative for back pain, gait problem and myalgias.   Skin:  Negative for rash and wound.   Neurological:  Negative for dizziness, weakness, numbness and headaches.   Hematological:  Negative for adenopathy. Does not bruise/bleed easily.  "  Psychiatric/Behavioral:  Negative for agitation, confusion, decreased concentration and suicidal ideas. The patient is not nervous/anxious.        Objective     Vitals:    07/15/25 1537   BP: 124/84   Pulse: 88   Temp: 98.6 °F (37 °C)   TempSrc: Infrared   SpO2: 96%   Weight: 97.2 kg (214 lb 3.2 oz)   Height: 185.4 cm (72.99\")       Physical Exam  Vitals and nursing note reviewed.   Constitutional:       Appearance: Normal appearance. He is well-developed. He is obese.   HENT:      Head: Normocephalic and atraumatic.   Eyes:      General: No scleral icterus.        Right eye: No discharge.         Left eye: No discharge.      Conjunctiva/sclera: Conjunctivae normal.      Pupils: Pupils are equal, round, and reactive to light.   Neck:      Thyroid: No thyromegaly.      Vascular: No JVD.      Trachea: No tracheal deviation.   Cardiovascular:      Rate and Rhythm: Normal rate and regular rhythm.      Heart sounds: Normal heart sounds. No murmur heard.     No friction rub. No gallop.   Pulmonary:      Effort: Pulmonary effort is normal. No respiratory distress.      Breath sounds: Normal breath sounds. No stridor. No wheezing, rhonchi or rales.   Chest:      Chest wall: Tenderness present.          Comments: There is no rash there is no erythema over the skin there is no retractions no flail chest he has tenderness to palpation from the left mid axillary ribs all the way around to his epigastric area down to the left marginal rib  Abdominal:      General: Bowel sounds are normal. There is no distension.      Palpations: Abdomen is soft. There is no mass.      Tenderness: There is no abdominal tenderness.   Musculoskeletal:         General: Normal range of motion.      Cervical back: Normal range of motion and neck supple.   Skin:     General: Skin is warm and dry.   Neurological:      Mental Status: He is alert and oriented to person, place, and time.      Cranial Nerves: No cranial nerve deficit.      Coordination: " Coordination normal.      Deep Tendon Reflexes: Reflexes are normal and symmetric. Reflexes normal.   Psychiatric:         Behavior: Behavior normal.         Thought Content: Thought content normal.         Judgment: Judgment normal.           Assessment & Plan     Problem List Items Addressed This Visit          Musculoskeletal and Injuries    Pain in thoracic spine       Pulmonary and Pneumonias    Rib pain on left side - Primary       ECG 12 Lead    Date/Time: 7/15/2025 4:17 PM  Performed by: Sabine Marquez MD    Authorized by: Sabine Marquez MD  Previous ECG: no previous ECG available  Rhythm: sinus rhythm  Rate: normal  BPM: 75  QRS axis: normal    Clinical impression: non-specific ECG          Assessment & Plan  1. Back and rib pain.  - Persistent pain despite previous treatment with steroid and muscle relaxer.  - Physical examination reveals tenderness under the rib cage without bruising or rash.  - EKG ordered to rule out cardiac involvement; if unremarkable, referral to ER for chest x-ray and cardiac enzyme tests.  - Patient advised on the possibility of musculoskeletal origin; further diagnostics planned to ensure comprehensive evaluation.  Patient was referred and agreeable to going to the ER by private vehicle.  Differential diagnosis is large however I want to make sure this is not cardiac or ischemic in nature his EKG does not show ST elevation.  There is not an old EKG to compare    His EKG does not show anything acute from an ischemic standpoint however the severity of his pain the fact that he had this ankle swelling and now this pleuritic pain I want a make sure he is not having PE or acute coronary syndrome.  Patient's going to the ER    Patient or patient representative verbalized consent for the use of Ambient Listening during the visit with  Sabine Marquez MD for chart documentation. 7/15/2025  15:58 EDT

## 2025-07-17 LAB
QT INTERVAL: 356 MS
QT INTERVAL: 396 MS
QTC INTERVAL: 402 MS
QTC INTERVAL: 405 MS

## 2025-08-19 ENCOUNTER — LAB (OUTPATIENT)
Dept: LAB | Facility: HOSPITAL | Age: 50
End: 2025-08-19
Payer: COMMERCIAL

## 2025-08-19 ENCOUNTER — OFFICE VISIT (OUTPATIENT)
Dept: FAMILY MEDICINE CLINIC | Facility: CLINIC | Age: 50
End: 2025-08-19
Payer: COMMERCIAL

## 2025-08-19 VITALS
HEIGHT: 73 IN | DIASTOLIC BLOOD PRESSURE: 84 MMHG | BODY MASS INDEX: 28.36 KG/M2 | WEIGHT: 214 LBS | TEMPERATURE: 98.2 F | SYSTOLIC BLOOD PRESSURE: 126 MMHG | OXYGEN SATURATION: 97 % | HEART RATE: 87 BPM

## 2025-08-19 DIAGNOSIS — M1A.0790 CHRONIC IDIOPATHIC GOUT INVOLVING TOE WITHOUT TOPHUS, UNSPECIFIED LATERALITY: ICD-10-CM

## 2025-08-19 DIAGNOSIS — Z12.5 PROSTATE CANCER SCREENING: ICD-10-CM

## 2025-08-19 DIAGNOSIS — I10 PRIMARY HYPERTENSION: ICD-10-CM

## 2025-08-19 DIAGNOSIS — Z12.11 COLON CANCER SCREENING: ICD-10-CM

## 2025-08-19 DIAGNOSIS — M54.6 PAIN IN THORACIC SPINE: ICD-10-CM

## 2025-08-19 DIAGNOSIS — Z00.00 WELL ADULT EXAM: Primary | ICD-10-CM

## 2025-08-19 DIAGNOSIS — Z00.00 WELL ADULT EXAM: ICD-10-CM

## 2025-08-19 LAB
ALBUMIN SERPL-MCNC: 4.5 G/DL (ref 3.5–5.2)
ALBUMIN/GLOB SERPL: 1.8 G/DL
ALP SERPL-CCNC: 93 U/L (ref 39–117)
ALT SERPL W P-5'-P-CCNC: 71 U/L (ref 1–41)
ANION GAP SERPL CALCULATED.3IONS-SCNC: 11.9 MMOL/L (ref 5–15)
AST SERPL-CCNC: 50 U/L (ref 1–40)
BASOPHILS # BLD AUTO: 0.04 10*3/MM3 (ref 0–0.2)
BASOPHILS NFR BLD AUTO: 1 % (ref 0–1.5)
BILIRUB SERPL-MCNC: 0.9 MG/DL (ref 0–1.2)
BUN SERPL-MCNC: 10 MG/DL (ref 6–20)
BUN/CREAT SERPL: 10.8 (ref 7–25)
CALCIUM SPEC-SCNC: 9.7 MG/DL (ref 8.6–10.5)
CHLORIDE SERPL-SCNC: 103 MMOL/L (ref 98–107)
CHOLEST SERPL-MCNC: 184 MG/DL (ref 0–200)
CO2 SERPL-SCNC: 22.1 MMOL/L (ref 22–29)
CREAT SERPL-MCNC: 0.93 MG/DL (ref 0.76–1.27)
DEPRECATED RDW RBC AUTO: 48.6 FL (ref 37–54)
EGFRCR SERPLBLD CKD-EPI 2021: 98.8 ML/MIN/1.73
EOSINOPHIL # BLD AUTO: 0.07 10*3/MM3 (ref 0–0.4)
EOSINOPHIL NFR BLD AUTO: 1.8 % (ref 0.3–6.2)
ERYTHROCYTE [DISTWIDTH] IN BLOOD BY AUTOMATED COUNT: 13.9 % (ref 12.3–15.4)
GLOBULIN UR ELPH-MCNC: 2.5 GM/DL
GLUCOSE SERPL-MCNC: 96 MG/DL (ref 65–99)
HBA1C MFR BLD: 4.9 % (ref 4.8–5.6)
HCT VFR BLD AUTO: 48 % (ref 37.5–51)
HDLC SERPL-MCNC: 50 MG/DL (ref 40–60)
HGB BLD-MCNC: 17.3 G/DL (ref 13–17.7)
IMM GRANULOCYTES # BLD AUTO: 0 10*3/MM3 (ref 0–0.05)
IMM GRANULOCYTES NFR BLD AUTO: 0 % (ref 0–0.5)
LDLC SERPL CALC-MCNC: 103 MG/DL (ref 0–100)
LDLC/HDLC SERPL: 1.96 {RATIO}
LYMPHOCYTES # BLD AUTO: 1.32 10*3/MM3 (ref 0.7–3.1)
LYMPHOCYTES NFR BLD AUTO: 34 % (ref 19.6–45.3)
MCH RBC QN AUTO: 34.9 PG (ref 26.6–33)
MCHC RBC AUTO-ENTMCNC: 36 G/DL (ref 31.5–35.7)
MCV RBC AUTO: 96.8 FL (ref 79–97)
MONOCYTES # BLD AUTO: 0.41 10*3/MM3 (ref 0.1–0.9)
MONOCYTES NFR BLD AUTO: 10.6 % (ref 5–12)
NEUTROPHILS NFR BLD AUTO: 2.04 10*3/MM3 (ref 1.7–7)
NEUTROPHILS NFR BLD AUTO: 52.6 % (ref 42.7–76)
NRBC BLD AUTO-RTO: 0 /100 WBC (ref 0–0.2)
PLATELET # BLD AUTO: 148 10*3/MM3 (ref 140–450)
PMV BLD AUTO: 9.7 FL (ref 6–12)
POTASSIUM SERPL-SCNC: 4.6 MMOL/L (ref 3.5–5.2)
PROT SERPL-MCNC: 7 G/DL (ref 6–8.5)
PSA SERPL-MCNC: 1.37 NG/ML (ref 0–4)
RBC # BLD AUTO: 4.96 10*6/MM3 (ref 4.14–5.8)
SODIUM SERPL-SCNC: 137 MMOL/L (ref 136–145)
TRIGL SERPL-MCNC: 179 MG/DL (ref 0–150)
VLDLC SERPL-MCNC: 31 MG/DL (ref 5–40)
WBC NRBC COR # BLD AUTO: 3.88 10*3/MM3 (ref 3.4–10.8)

## 2025-08-19 PROCEDURE — 85025 COMPLETE CBC W/AUTO DIFF WBC: CPT

## 2025-08-19 PROCEDURE — G0103 PSA SCREENING: HCPCS

## 2025-08-19 PROCEDURE — 83036 HEMOGLOBIN GLYCOSYLATED A1C: CPT

## 2025-08-19 PROCEDURE — 80053 COMPREHEN METABOLIC PANEL: CPT

## 2025-08-19 PROCEDURE — 80061 LIPID PANEL: CPT

## 2025-08-19 RX ORDER — LOSARTAN POTASSIUM 100 MG/1
100 TABLET ORAL DAILY
Qty: 90 TABLET | Refills: 3 | Status: SHIPPED | OUTPATIENT
Start: 2025-08-19

## 2025-08-19 RX ORDER — MELOXICAM 15 MG/1
15 TABLET ORAL DAILY
Qty: 30 TABLET | Refills: 3 | Status: SHIPPED | OUTPATIENT
Start: 2025-08-19

## 2025-08-19 RX ORDER — ALLOPURINOL 300 MG/1
300 TABLET ORAL DAILY
Qty: 90 TABLET | Refills: 3 | Status: SHIPPED | OUTPATIENT
Start: 2025-08-19